# Patient Record
Sex: FEMALE | Race: WHITE | NOT HISPANIC OR LATINO | Employment: OTHER | URBAN - METROPOLITAN AREA
[De-identification: names, ages, dates, MRNs, and addresses within clinical notes are randomized per-mention and may not be internally consistent; named-entity substitution may affect disease eponyms.]

---

## 2017-03-01 ENCOUNTER — AMBULATORY - HEALTHEAST (OUTPATIENT)
Dept: CARDIOLOGY | Facility: CLINIC | Age: 82
End: 2017-03-01

## 2017-03-01 DIAGNOSIS — Z95.0 PACEMAKER: ICD-10-CM

## 2017-03-09 ENCOUNTER — AMBULATORY - HEALTHEAST (OUTPATIENT)
Dept: CARDIOLOGY | Facility: CLINIC | Age: 82
End: 2017-03-09

## 2017-03-09 DIAGNOSIS — I48.20 ATRIAL FIBRILLATION, CHRONIC (H): ICD-10-CM

## 2017-03-16 ENCOUNTER — AMBULATORY - HEALTHEAST (OUTPATIENT)
Dept: CARDIOLOGY | Facility: CLINIC | Age: 82
End: 2017-03-16

## 2017-03-16 DIAGNOSIS — I48.20 ATRIAL FIBRILLATION, CHRONIC (H): ICD-10-CM

## 2017-03-23 ENCOUNTER — AMBULATORY - HEALTHEAST (OUTPATIENT)
Dept: CARDIOLOGY | Facility: CLINIC | Age: 82
End: 2017-03-23

## 2017-03-23 DIAGNOSIS — I48.20 ATRIAL FIBRILLATION, CHRONIC (H): ICD-10-CM

## 2017-04-06 ENCOUNTER — AMBULATORY - HEALTHEAST (OUTPATIENT)
Dept: CARDIOLOGY | Facility: CLINIC | Age: 82
End: 2017-04-06

## 2017-04-06 DIAGNOSIS — I48.20 CHRONIC ATRIAL FIBRILLATION (H): ICD-10-CM

## 2017-04-06 DIAGNOSIS — I48.20 ATRIAL FIBRILLATION, CHRONIC (H): ICD-10-CM

## 2017-05-04 ENCOUNTER — AMBULATORY - HEALTHEAST (OUTPATIENT)
Dept: CARDIOLOGY | Facility: CLINIC | Age: 82
End: 2017-05-04

## 2017-05-04 DIAGNOSIS — I48.20 ATRIAL FIBRILLATION, CHRONIC (H): ICD-10-CM

## 2017-05-31 ENCOUNTER — RECORDS - HEALTHEAST (OUTPATIENT)
Dept: LAB | Facility: CLINIC | Age: 82
End: 2017-05-31

## 2017-05-31 LAB
CHOLEST SERPL-MCNC: 175 MG/DL
FASTING STATUS PATIENT QL REPORTED: NO
HDLC SERPL-MCNC: 52 MG/DL
LDLC SERPL CALC-MCNC: 96 MG/DL
TRIGL SERPL-MCNC: 136 MG/DL

## 2017-06-01 ENCOUNTER — AMBULATORY - HEALTHEAST (OUTPATIENT)
Dept: CARDIOLOGY | Facility: CLINIC | Age: 82
End: 2017-06-01

## 2017-06-01 DIAGNOSIS — I48.20 ATRIAL FIBRILLATION, CHRONIC (H): ICD-10-CM

## 2017-06-08 ENCOUNTER — AMBULATORY - HEALTHEAST (OUTPATIENT)
Dept: CARDIOLOGY | Facility: CLINIC | Age: 82
End: 2017-06-08

## 2017-06-08 DIAGNOSIS — I48.20 ATRIAL FIBRILLATION, CHRONIC (H): ICD-10-CM

## 2017-06-13 ENCOUNTER — AMBULATORY - HEALTHEAST (OUTPATIENT)
Dept: CARDIOLOGY | Facility: CLINIC | Age: 82
End: 2017-06-13

## 2017-06-13 DIAGNOSIS — I48.20 ATRIAL FIBRILLATION, CHRONIC (H): ICD-10-CM

## 2017-06-20 ENCOUNTER — AMBULATORY - HEALTHEAST (OUTPATIENT)
Dept: CARDIOLOGY | Facility: CLINIC | Age: 82
End: 2017-06-20

## 2017-06-20 DIAGNOSIS — I48.20 ATRIAL FIBRILLATION, CHRONIC (H): ICD-10-CM

## 2017-06-27 ENCOUNTER — AMBULATORY - HEALTHEAST (OUTPATIENT)
Dept: CARDIOLOGY | Facility: CLINIC | Age: 82
End: 2017-06-27

## 2017-06-27 DIAGNOSIS — I48.91 ATRIAL FIBRILLATION (H): ICD-10-CM

## 2017-06-27 DIAGNOSIS — I48.20 ATRIAL FIBRILLATION, CHRONIC (H): ICD-10-CM

## 2017-07-12 ENCOUNTER — AMBULATORY - HEALTHEAST (OUTPATIENT)
Dept: CARDIOLOGY | Facility: CLINIC | Age: 82
End: 2017-07-12

## 2017-07-12 DIAGNOSIS — I48.20 ATRIAL FIBRILLATION, CHRONIC (H): ICD-10-CM

## 2017-07-13 ENCOUNTER — AMBULATORY - HEALTHEAST (OUTPATIENT)
Dept: CARDIOLOGY | Facility: CLINIC | Age: 82
End: 2017-07-13

## 2017-07-13 DIAGNOSIS — Z95.0 CARDIAC PACEMAKER IN SITU: ICD-10-CM

## 2017-07-13 LAB — HCC DEVICE COMMENTS: NORMAL

## 2017-07-19 ENCOUNTER — AMBULATORY - HEALTHEAST (OUTPATIENT)
Dept: CARDIOLOGY | Facility: CLINIC | Age: 82
End: 2017-07-19

## 2017-07-19 DIAGNOSIS — I48.20 ATRIAL FIBRILLATION, CHRONIC (H): ICD-10-CM

## 2017-08-09 ENCOUNTER — AMBULATORY - HEALTHEAST (OUTPATIENT)
Dept: CARDIOLOGY | Facility: CLINIC | Age: 82
End: 2017-08-09

## 2017-08-09 DIAGNOSIS — I48.20 ATRIAL FIBRILLATION, CHRONIC (H): ICD-10-CM

## 2017-08-30 ENCOUNTER — AMBULATORY - HEALTHEAST (OUTPATIENT)
Dept: CARDIOLOGY | Facility: CLINIC | Age: 82
End: 2017-08-30

## 2017-08-30 DIAGNOSIS — I48.20 ATRIAL FIBRILLATION, CHRONIC (H): ICD-10-CM

## 2017-09-27 ENCOUNTER — RECORDS - HEALTHEAST (OUTPATIENT)
Dept: ADMINISTRATIVE | Facility: OTHER | Age: 82
End: 2017-09-27

## 2017-09-27 ENCOUNTER — AMBULATORY - HEALTHEAST (OUTPATIENT)
Dept: CARDIOLOGY | Facility: CLINIC | Age: 82
End: 2017-09-27

## 2017-09-27 DIAGNOSIS — I48.20 ATRIAL FIBRILLATION, CHRONIC (H): ICD-10-CM

## 2017-09-28 ENCOUNTER — HOSPITAL ENCOUNTER (OUTPATIENT)
Dept: CT IMAGING | Facility: HOSPITAL | Age: 82
Discharge: HOME | End: 2017-09-28
Attending: INTERNAL MEDICINE

## 2017-09-28 DIAGNOSIS — C50.919 BREAST CANCER (H): ICD-10-CM

## 2017-10-09 ENCOUNTER — RECORDS - HEALTHEAST (OUTPATIENT)
Dept: ADMINISTRATIVE | Facility: OTHER | Age: 82
End: 2017-10-09

## 2017-10-17 ENCOUNTER — AMBULATORY - HEALTHEAST (OUTPATIENT)
Dept: CARDIOLOGY | Facility: CLINIC | Age: 82
End: 2017-10-17

## 2017-10-17 DIAGNOSIS — Z95.0 CARDIAC PACEMAKER IN SITU: ICD-10-CM

## 2017-10-17 LAB — HCC DEVICE COMMENTS: NORMAL

## 2017-10-17 ASSESSMENT — MIFFLIN-ST. JEOR: SCORE: 1056.9

## 2017-10-23 ENCOUNTER — AMBULATORY - HEALTHEAST (OUTPATIENT)
Dept: PULMONOLOGY | Facility: OTHER | Age: 82
End: 2017-10-23

## 2017-10-23 DIAGNOSIS — K21.9 GERD (GASTROESOPHAGEAL REFLUX DISEASE): ICD-10-CM

## 2017-10-25 ENCOUNTER — AMBULATORY - HEALTHEAST (OUTPATIENT)
Dept: CARDIOLOGY | Facility: CLINIC | Age: 82
End: 2017-10-25

## 2017-10-25 DIAGNOSIS — I48.20 ATRIAL FIBRILLATION, CHRONIC (H): ICD-10-CM

## 2017-11-01 ENCOUNTER — AMBULATORY - HEALTHEAST (OUTPATIENT)
Dept: CARDIOLOGY | Facility: CLINIC | Age: 82
End: 2017-11-01

## 2017-11-01 DIAGNOSIS — I48.20 ATRIAL FIBRILLATION, CHRONIC (H): ICD-10-CM

## 2017-11-15 ENCOUNTER — OFFICE VISIT - HEALTHEAST (OUTPATIENT)
Dept: PULMONOLOGY | Facility: OTHER | Age: 82
End: 2017-11-15

## 2017-11-15 DIAGNOSIS — G47.34 NOCTURNAL HYPOXIA: ICD-10-CM

## 2017-11-15 DIAGNOSIS — K21.9 GASTROESOPHAGEAL REFLUX DISEASE WITHOUT ESOPHAGITIS: ICD-10-CM

## 2017-11-15 DIAGNOSIS — R09.02 HYPOXIA: ICD-10-CM

## 2017-11-15 DIAGNOSIS — J44.9 CHRONIC OBSTRUCTIVE PULMONARY DISEASE, UNSPECIFIED COPD TYPE (H): ICD-10-CM

## 2017-11-15 ASSESSMENT — MIFFLIN-ST. JEOR: SCORE: 1081.39

## 2017-11-28 ENCOUNTER — COMMUNICATION - HEALTHEAST (OUTPATIENT)
Dept: CARDIOLOGY | Facility: CLINIC | Age: 82
End: 2017-11-28

## 2017-11-28 DIAGNOSIS — I48.91 ATRIAL FIBRILLATION (H): ICD-10-CM

## 2017-11-29 ENCOUNTER — AMBULATORY - HEALTHEAST (OUTPATIENT)
Dept: CARDIOLOGY | Facility: CLINIC | Age: 82
End: 2017-11-29

## 2017-11-29 DIAGNOSIS — I48.20 ATRIAL FIBRILLATION, CHRONIC (H): ICD-10-CM

## 2017-12-06 ENCOUNTER — AMBULATORY - HEALTHEAST (OUTPATIENT)
Dept: CARDIOLOGY | Facility: CLINIC | Age: 82
End: 2017-12-06

## 2017-12-06 DIAGNOSIS — I48.20 ATRIAL FIBRILLATION, CHRONIC (H): ICD-10-CM

## 2017-12-19 ENCOUNTER — HOSPITAL ENCOUNTER (OUTPATIENT)
Dept: CT IMAGING | Facility: HOSPITAL | Age: 82
Discharge: HOME | End: 2017-12-19
Attending: INTERNAL MEDICINE

## 2017-12-19 DIAGNOSIS — D47.2 MONOCLONAL GAMMOPATHIES: ICD-10-CM

## 2017-12-20 ENCOUNTER — AMBULATORY - HEALTHEAST (OUTPATIENT)
Dept: CARDIOLOGY | Facility: CLINIC | Age: 82
End: 2017-12-20

## 2017-12-20 DIAGNOSIS — I48.20 ATRIAL FIBRILLATION, CHRONIC (H): ICD-10-CM

## 2017-12-29 ENCOUNTER — RECORDS - HEALTHEAST (OUTPATIENT)
Dept: ADMINISTRATIVE | Facility: OTHER | Age: 82
End: 2017-12-29

## 2017-12-29 ENCOUNTER — AMBULATORY - HEALTHEAST (OUTPATIENT)
Dept: CARDIOLOGY | Facility: CLINIC | Age: 82
End: 2017-12-29

## 2018-01-04 ENCOUNTER — AMBULATORY - HEALTHEAST (OUTPATIENT)
Dept: CARDIOLOGY | Facility: CLINIC | Age: 83
End: 2018-01-04

## 2018-01-04 DIAGNOSIS — I25.10 CORONARY ARTERY DISEASE: ICD-10-CM

## 2018-01-04 DIAGNOSIS — Z95.0 CARDIAC PACEMAKER IN SITU: ICD-10-CM

## 2018-01-04 DIAGNOSIS — I48.20 ATRIAL FIBRILLATION, CHRONIC (H): ICD-10-CM

## 2018-01-04 LAB
DIGOXIN LEVEL LHE- HISTORICAL: <0.3 NG/ML (ref 0.5–2)
HCC DEVICE COMMENTS: NORMAL
INTERNATIONAL NORMALIZATION RATIO, POC - HISTORICAL: 1.7

## 2018-01-04 ASSESSMENT — MIFFLIN-ST. JEOR: SCORE: 1043.29

## 2018-01-05 ENCOUNTER — COMMUNICATION - HEALTHEAST (OUTPATIENT)
Dept: CARDIOLOGY | Facility: CLINIC | Age: 83
End: 2018-01-05

## 2018-01-18 ENCOUNTER — AMBULATORY - HEALTHEAST (OUTPATIENT)
Dept: CARDIOLOGY | Facility: CLINIC | Age: 83
End: 2018-01-18

## 2018-01-18 DIAGNOSIS — I48.20 ATRIAL FIBRILLATION, CHRONIC (H): ICD-10-CM

## 2018-01-18 LAB — INTERNATIONAL NORMALIZATION RATIO, POC - HISTORICAL: 3.6 (ref 0.9–1.1)

## 2018-02-01 ENCOUNTER — AMBULATORY - HEALTHEAST (OUTPATIENT)
Dept: CARDIOLOGY | Facility: CLINIC | Age: 83
End: 2018-02-01

## 2018-02-01 DIAGNOSIS — I48.20 ATRIAL FIBRILLATION, CHRONIC (H): ICD-10-CM

## 2018-02-01 DIAGNOSIS — I48.91 A-FIB (H): ICD-10-CM

## 2018-02-01 LAB — INTERNATIONAL NORMALIZATION RATIO, POC - HISTORICAL: 2.1

## 2018-02-28 ENCOUNTER — AMBULATORY - HEALTHEAST (OUTPATIENT)
Dept: CARDIOLOGY | Facility: CLINIC | Age: 83
End: 2018-02-28

## 2018-02-28 DIAGNOSIS — I48.20 ATRIAL FIBRILLATION, CHRONIC (H): ICD-10-CM

## 2018-02-28 LAB — INTERNATIONAL NORMALIZATION RATIO, POC - HISTORICAL: 2.4

## 2018-03-28 ENCOUNTER — AMBULATORY - HEALTHEAST (OUTPATIENT)
Dept: CARDIOLOGY | Facility: CLINIC | Age: 83
End: 2018-03-28

## 2018-03-28 DIAGNOSIS — I48.20 ATRIAL FIBRILLATION, CHRONIC (H): ICD-10-CM

## 2018-03-28 LAB — INTERNATIONAL NORMALIZATION RATIO, POC - HISTORICAL: 1.5

## 2018-04-04 ENCOUNTER — AMBULATORY - HEALTHEAST (OUTPATIENT)
Dept: CARDIOLOGY | Facility: CLINIC | Age: 83
End: 2018-04-04

## 2018-04-04 DIAGNOSIS — Z95.0 CARDIAC PACEMAKER IN SITU: ICD-10-CM

## 2018-04-04 DIAGNOSIS — I48.20 ATRIAL FIBRILLATION, CHRONIC (H): ICD-10-CM

## 2018-04-04 LAB — INTERNATIONAL NORMALIZATION RATIO, POC - HISTORICAL: 2.1

## 2018-04-05 LAB — HCC DEVICE COMMENTS: NORMAL

## 2018-05-02 ENCOUNTER — AMBULATORY - HEALTHEAST (OUTPATIENT)
Dept: CARDIOLOGY | Facility: CLINIC | Age: 83
End: 2018-05-02

## 2018-05-02 DIAGNOSIS — I48.20 CHRONIC ATRIAL FIBRILLATION (H): ICD-10-CM

## 2018-05-02 DIAGNOSIS — I48.20 ATRIAL FIBRILLATION, CHRONIC (H): ICD-10-CM

## 2018-05-02 LAB — INTERNATIONAL NORMALIZATION RATIO, POC - HISTORICAL: 1.3

## 2018-05-09 ENCOUNTER — AMBULATORY - HEALTHEAST (OUTPATIENT)
Dept: CARDIOLOGY | Facility: CLINIC | Age: 83
End: 2018-05-09

## 2018-05-09 DIAGNOSIS — I48.20 ATRIAL FIBRILLATION, CHRONIC (H): ICD-10-CM

## 2018-05-09 LAB — INTERNATIONAL NORMALIZATION RATIO, POC - HISTORICAL: 1.7

## 2018-05-23 ENCOUNTER — AMBULATORY - HEALTHEAST (OUTPATIENT)
Dept: CARDIOLOGY | Facility: CLINIC | Age: 83
End: 2018-05-23

## 2018-05-23 DIAGNOSIS — I48.20 ATRIAL FIBRILLATION, CHRONIC (H): ICD-10-CM

## 2018-05-23 LAB — INTERNATIONAL NORMALIZATION RATIO, POC - HISTORICAL: 1.4

## 2018-05-30 ENCOUNTER — AMBULATORY - HEALTHEAST (OUTPATIENT)
Dept: CARDIOLOGY | Facility: CLINIC | Age: 83
End: 2018-05-30

## 2018-05-30 ENCOUNTER — COMMUNICATION - HEALTHEAST (OUTPATIENT)
Dept: CARDIOLOGY | Facility: CLINIC | Age: 83
End: 2018-05-30

## 2018-05-30 DIAGNOSIS — I48.91 A-FIB (H): ICD-10-CM

## 2018-05-30 DIAGNOSIS — I48.20 ATRIAL FIBRILLATION, CHRONIC (H): ICD-10-CM

## 2018-05-30 LAB — INTERNATIONAL NORMALIZATION RATIO, POC - HISTORICAL: 1.2

## 2018-06-06 ENCOUNTER — AMBULATORY - HEALTHEAST (OUTPATIENT)
Dept: CARDIOLOGY | Facility: CLINIC | Age: 83
End: 2018-06-06

## 2018-06-06 DIAGNOSIS — I48.20 ATRIAL FIBRILLATION, CHRONIC (H): ICD-10-CM

## 2018-06-06 LAB — INTERNATIONAL NORMALIZATION RATIO, POC - HISTORICAL: 1.6

## 2018-06-20 ENCOUNTER — AMBULATORY - HEALTHEAST (OUTPATIENT)
Dept: CARDIOLOGY | Facility: CLINIC | Age: 83
End: 2018-06-20

## 2018-06-20 DIAGNOSIS — I48.20 ATRIAL FIBRILLATION, CHRONIC (H): ICD-10-CM

## 2018-06-20 LAB — INTERNATIONAL NORMALIZATION RATIO, POC - HISTORICAL: 2.6

## 2018-07-05 ENCOUNTER — AMBULATORY - HEALTHEAST (OUTPATIENT)
Dept: CARDIOLOGY | Facility: CLINIC | Age: 83
End: 2018-07-05

## 2018-07-05 DIAGNOSIS — I48.20 ATRIAL FIBRILLATION, CHRONIC (H): ICD-10-CM

## 2018-07-05 LAB — INTERNATIONAL NORMALIZATION RATIO, POC - HISTORICAL: 2.7

## 2018-07-10 ENCOUNTER — AMBULATORY - HEALTHEAST (OUTPATIENT)
Dept: CARDIOLOGY | Facility: CLINIC | Age: 83
End: 2018-07-10

## 2018-07-10 DIAGNOSIS — Z95.0 PACEMAKER: ICD-10-CM

## 2018-07-11 LAB
HCC DEVICE COMMENTS: NORMAL
HCC DEVICE IMPLANTING PROVIDER: NORMAL
HCC DEVICE MANUFACTURE: NORMAL
HCC DEVICE MODEL: NORMAL
HCC DEVICE SERIAL NUMBER: NORMAL
HCC DEVICE TYPE: NORMAL

## 2018-07-15 ENCOUNTER — COMMUNICATION - HEALTHEAST (OUTPATIENT)
Dept: CARDIOLOGY | Facility: CLINIC | Age: 83
End: 2018-07-15

## 2018-07-15 DIAGNOSIS — I48.91 A-FIB (H): ICD-10-CM

## 2018-08-02 ENCOUNTER — AMBULATORY - HEALTHEAST (OUTPATIENT)
Dept: CARDIOLOGY | Facility: CLINIC | Age: 83
End: 2018-08-02

## 2018-08-02 DIAGNOSIS — I48.20 ATRIAL FIBRILLATION, CHRONIC (H): ICD-10-CM

## 2018-08-02 LAB — INTERNATIONAL NORMALIZATION RATIO, POC - HISTORICAL: 2.7

## 2018-08-29 ENCOUNTER — AMBULATORY - HEALTHEAST (OUTPATIENT)
Dept: CARDIOLOGY | Facility: CLINIC | Age: 83
End: 2018-08-29

## 2018-08-29 DIAGNOSIS — I48.20 ATRIAL FIBRILLATION, CHRONIC (H): ICD-10-CM

## 2018-08-29 LAB — INTERNATIONAL NORMALIZATION RATIO, POC - HISTORICAL: 1.8

## 2018-10-03 ENCOUNTER — AMBULATORY - HEALTHEAST (OUTPATIENT)
Dept: CARDIOLOGY | Facility: CLINIC | Age: 83
End: 2018-10-03

## 2018-10-03 DIAGNOSIS — I48.20 ATRIAL FIBRILLATION, CHRONIC (H): ICD-10-CM

## 2018-10-03 LAB — INTERNATIONAL NORMALIZATION RATIO, POC - HISTORICAL: 2.1

## 2018-10-12 ENCOUNTER — AMBULATORY - HEALTHEAST (OUTPATIENT)
Dept: CARDIOLOGY | Facility: CLINIC | Age: 83
End: 2018-10-12

## 2018-10-12 DIAGNOSIS — Z95.0 CARDIAC PACEMAKER IN SITU: ICD-10-CM

## 2018-10-12 ASSESSMENT — MIFFLIN-ST. JEOR: SCORE: 1038.75

## 2018-11-07 ENCOUNTER — AMBULATORY - HEALTHEAST (OUTPATIENT)
Dept: CARDIOLOGY | Facility: CLINIC | Age: 83
End: 2018-11-07

## 2018-11-07 DIAGNOSIS — I48.20 ATRIAL FIBRILLATION, CHRONIC (H): ICD-10-CM

## 2018-11-07 LAB — INTERNATIONAL NORMALIZATION RATIO, POC - HISTORICAL: 1.6

## 2018-11-21 ENCOUNTER — AMBULATORY - HEALTHEAST (OUTPATIENT)
Dept: CARDIOLOGY | Facility: CLINIC | Age: 83
End: 2018-11-21

## 2018-11-21 DIAGNOSIS — I48.20 ATRIAL FIBRILLATION, CHRONIC (H): ICD-10-CM

## 2018-11-21 LAB — INTERNATIONAL NORMALIZATION RATIO, POC - HISTORICAL: 1.6

## 2019-01-01 ENCOUNTER — COMMUNICATION - HEALTHEAST (OUTPATIENT)
Dept: CARDIOLOGY | Facility: CLINIC | Age: 84
End: 2019-01-01

## 2019-01-01 ENCOUNTER — RECORDS - HEALTHEAST (OUTPATIENT)
Dept: ADMINISTRATIVE | Facility: OTHER | Age: 84
End: 2019-01-01

## 2019-01-01 ENCOUNTER — AMBULATORY - HEALTHEAST (OUTPATIENT)
Dept: CARDIOLOGY | Facility: CLINIC | Age: 84
End: 2019-01-01

## 2019-01-01 ENCOUNTER — RECORDS - HEALTHEAST (OUTPATIENT)
Dept: LAB | Facility: CLINIC | Age: 84
End: 2019-01-01

## 2019-01-01 ENCOUNTER — SURGERY - HEALTHEAST (OUTPATIENT)
Dept: CARDIOLOGY | Facility: CLINIC | Age: 84
End: 2019-01-01

## 2019-01-01 ENCOUNTER — OFFICE VISIT - HEALTHEAST (OUTPATIENT)
Dept: PULMONOLOGY | Facility: OTHER | Age: 84
End: 2019-01-01

## 2019-01-01 DIAGNOSIS — Z95.0 CARDIAC PACEMAKER IN SITU: ICD-10-CM

## 2019-01-01 DIAGNOSIS — I48.20 ATRIAL FIBRILLATION, CHRONIC (H): ICD-10-CM

## 2019-01-01 DIAGNOSIS — I25.84 CORONARY ARTERY DISEASE DUE TO CALCIFIED CORONARY LESION: ICD-10-CM

## 2019-01-01 DIAGNOSIS — I25.10 CORONARY ARTERY DISEASE DUE TO CALCIFIED CORONARY LESION: ICD-10-CM

## 2019-01-01 DIAGNOSIS — J44.9 CHRONIC OBSTRUCTIVE PULMONARY DISEASE, UNSPECIFIED COPD TYPE (H): ICD-10-CM

## 2019-01-01 DIAGNOSIS — K21.9 GASTROESOPHAGEAL REFLUX DISEASE WITHOUT ESOPHAGITIS: ICD-10-CM

## 2019-01-01 DIAGNOSIS — I49.5 TACHY-BRADY SYNDROME (H): ICD-10-CM

## 2019-01-01 DIAGNOSIS — R09.02 HYPOXIA: ICD-10-CM

## 2019-01-01 LAB
ALBUMIN SERPL-MCNC: 3.3 G/DL (ref 3.5–5)
ALP SERPL-CCNC: 96 U/L (ref 45–120)
ALT SERPL W P-5'-P-CCNC: 15 U/L (ref 0–45)
ANION GAP SERPL CALCULATED.3IONS-SCNC: 10 MMOL/L (ref 5–18)
AST SERPL W P-5'-P-CCNC: 26 U/L (ref 0–40)
BILIRUB SERPL-MCNC: 0.7 MG/DL (ref 0–1)
BUN SERPL-MCNC: 14 MG/DL (ref 8–28)
CALCIUM SERPL-MCNC: 9.2 MG/DL (ref 8.5–10.5)
CHLORIDE BLD-SCNC: 106 MMOL/L (ref 98–107)
CHOLEST SERPL-MCNC: 126 MG/DL
CO2 SERPL-SCNC: 27 MMOL/L (ref 22–31)
CREAT SERPL-MCNC: 0.79 MG/DL (ref 0.6–1.1)
FASTING STATUS PATIENT QL REPORTED: ABNORMAL
GFR SERPL CREATININE-BSD FRML MDRD: >60 ML/MIN/1.73M2
GLUCOSE BLD-MCNC: 97 MG/DL (ref 70–125)
HCC DEVICE COMMENTS: NORMAL
HCC DEVICE IMPLANTING PROVIDER: NORMAL
HCC DEVICE MANUFACTURE: NORMAL
HCC DEVICE MODEL: NORMAL
HCC DEVICE SERIAL NUMBER: NORMAL
HCC DEVICE TYPE: NORMAL
HDLC SERPL-MCNC: 48 MG/DL
LDLC SERPL CALC-MCNC: 63 MG/DL
POC INR - HE - HISTORICAL: 3.8 (ref 0.9–1.1)
POTASSIUM BLD-SCNC: 4.7 MMOL/L (ref 3.5–5)
PROT SERPL-MCNC: 6.7 G/DL (ref 6–8)
SODIUM SERPL-SCNC: 143 MMOL/L (ref 136–145)
TRIGL SERPL-MCNC: 77 MG/DL
TSH SERPL DL<=0.005 MIU/L-ACNC: 1.01 UIU/ML (ref 0.3–5)

## 2019-01-01 ASSESSMENT — MIFFLIN-ST. JEOR
SCORE: 977.52
SCORE: 986.59
SCORE: 959.37
SCORE: 1007

## 2019-01-02 ENCOUNTER — AMBULATORY - HEALTHEAST (OUTPATIENT)
Dept: CARDIOLOGY | Facility: CLINIC | Age: 84
End: 2019-01-02

## 2019-01-02 DIAGNOSIS — I48.20 ATRIAL FIBRILLATION, CHRONIC (H): ICD-10-CM

## 2019-01-02 LAB — INTERNATIONAL NORMALIZATION RATIO, POC - HISTORICAL: 1.1

## 2019-01-11 ENCOUNTER — COMMUNICATION - HEALTHEAST (OUTPATIENT)
Dept: PULMONOLOGY | Facility: OTHER | Age: 84
End: 2019-01-11

## 2019-01-11 DIAGNOSIS — K21.9 GERD (GASTROESOPHAGEAL REFLUX DISEASE): ICD-10-CM

## 2019-01-16 ENCOUNTER — AMBULATORY - HEALTHEAST (OUTPATIENT)
Dept: CARDIOLOGY | Facility: CLINIC | Age: 84
End: 2019-01-16

## 2019-01-16 DIAGNOSIS — I48.20 ATRIAL FIBRILLATION, CHRONIC (H): ICD-10-CM

## 2019-01-16 LAB — INTERNATIONAL NORMALIZATION RATIO, POC - HISTORICAL: 1.4

## 2019-01-17 ENCOUNTER — COMMUNICATION - HEALTHEAST (OUTPATIENT)
Dept: CARDIOLOGY | Facility: CLINIC | Age: 84
End: 2019-01-17

## 2019-01-17 DIAGNOSIS — I48.91 A-FIB (H): ICD-10-CM

## 2019-01-18 ENCOUNTER — RECORDS - HEALTHEAST (OUTPATIENT)
Dept: ADMINISTRATIVE | Facility: OTHER | Age: 84
End: 2019-01-18

## 2019-01-24 ENCOUNTER — AMBULATORY - HEALTHEAST (OUTPATIENT)
Dept: CARDIOLOGY | Facility: CLINIC | Age: 84
End: 2019-01-24

## 2019-01-24 DIAGNOSIS — Z95.0 CARDIAC PACEMAKER IN SITU: ICD-10-CM

## 2019-01-24 DIAGNOSIS — Z79.01 LONG TERM (CURRENT) USE OF ANTICOAGULANTS: ICD-10-CM

## 2019-01-24 DIAGNOSIS — I25.84 CORONARY ARTERY DISEASE DUE TO CALCIFIED CORONARY LESION: ICD-10-CM

## 2019-01-24 DIAGNOSIS — I48.20 ATRIAL FIBRILLATION, CHRONIC (H): ICD-10-CM

## 2019-01-24 DIAGNOSIS — E78.5 HYPERLIPIDEMIA LDL GOAL <70: ICD-10-CM

## 2019-01-24 DIAGNOSIS — I25.10 CORONARY ARTERY DISEASE DUE TO CALCIFIED CORONARY LESION: ICD-10-CM

## 2019-01-24 LAB — POC INR - HE - HISTORICAL: 5 (ref 0.9–1.1)

## 2019-01-24 ASSESSMENT — MIFFLIN-ST. JEOR: SCORE: 995.09

## 2019-02-27 ENCOUNTER — AMBULATORY - HEALTHEAST (OUTPATIENT)
Dept: CARDIOLOGY | Facility: CLINIC | Age: 84
End: 2019-02-27

## 2019-02-27 DIAGNOSIS — I48.20 ATRIAL FIBRILLATION, CHRONIC (H): ICD-10-CM

## 2019-02-27 LAB — INTERNATIONAL NORMALIZATION RATIO, POC - HISTORICAL: 1.5

## 2019-03-05 ENCOUNTER — OFFICE VISIT - HEALTHEAST (OUTPATIENT)
Dept: PULMONOLOGY | Facility: OTHER | Age: 84
End: 2019-03-05

## 2019-03-05 DIAGNOSIS — J44.9 CHRONIC OBSTRUCTIVE PULMONARY DISEASE, UNSPECIFIED COPD TYPE (H): ICD-10-CM

## 2019-03-05 DIAGNOSIS — K21.9 GASTROESOPHAGEAL REFLUX DISEASE WITHOUT ESOPHAGITIS: ICD-10-CM

## 2019-03-05 DIAGNOSIS — R09.02 HYPOXIA: ICD-10-CM

## 2019-03-05 ASSESSMENT — MIFFLIN-ST. JEOR: SCORE: 976.94

## 2019-03-07 ENCOUNTER — AMBULATORY - HEALTHEAST (OUTPATIENT)
Dept: CARDIOLOGY | Facility: CLINIC | Age: 84
End: 2019-03-07

## 2019-03-07 DIAGNOSIS — I25.10 CORONARY ARTERY DISEASE DUE TO CALCIFIED CORONARY LESION: ICD-10-CM

## 2019-03-07 DIAGNOSIS — Z95.0 CARDIAC PACEMAKER IN SITU: ICD-10-CM

## 2019-03-07 DIAGNOSIS — I25.84 CORONARY ARTERY DISEASE DUE TO CALCIFIED CORONARY LESION: ICD-10-CM

## 2019-03-07 DIAGNOSIS — I48.20 ATRIAL FIBRILLATION, CHRONIC (H): ICD-10-CM

## 2019-03-07 LAB
ANION GAP SERPL CALCULATED.3IONS-SCNC: 6 MMOL/L (ref 5–18)
BUN SERPL-MCNC: 12 MG/DL (ref 8–28)
CALCIUM SERPL-MCNC: 9.7 MG/DL (ref 8.5–10.5)
CHLORIDE BLD-SCNC: 107 MMOL/L (ref 98–107)
CO2 SERPL-SCNC: 30 MMOL/L (ref 22–31)
CREAT SERPL-MCNC: 0.8 MG/DL (ref 0.6–1.1)
DIGOXIN LEVEL LHE- HISTORICAL: <0.3 NG/ML (ref 0.5–2)
GFR SERPL CREATININE-BSD FRML MDRD: >60 ML/MIN/1.73M2
GLUCOSE BLD-MCNC: 94 MG/DL (ref 70–125)
INTERNATIONAL NORMALIZATION RATIO, POC - HISTORICAL: 2.4
POTASSIUM BLD-SCNC: 5.1 MMOL/L (ref 3.5–5)
SODIUM SERPL-SCNC: 143 MMOL/L (ref 136–145)

## 2019-03-07 ASSESSMENT — MIFFLIN-ST. JEOR: SCORE: 976.94

## 2019-03-08 ENCOUNTER — COMMUNICATION - HEALTHEAST (OUTPATIENT)
Dept: CARDIOLOGY | Facility: CLINIC | Age: 84
End: 2019-03-08

## 2019-03-12 ENCOUNTER — COMMUNICATION - HEALTHEAST (OUTPATIENT)
Dept: CARDIOLOGY | Facility: CLINIC | Age: 84
End: 2019-03-12

## 2019-03-21 ENCOUNTER — AMBULATORY - HEALTHEAST (OUTPATIENT)
Dept: CARDIOLOGY | Facility: CLINIC | Age: 84
End: 2019-03-21

## 2019-03-21 DIAGNOSIS — Z95.0 CARDIAC PACEMAKER IN SITU: ICD-10-CM

## 2019-03-28 ENCOUNTER — COMMUNICATION - HEALTHEAST (OUTPATIENT)
Dept: CARDIOLOGY | Facility: CLINIC | Age: 84
End: 2019-03-28

## 2019-03-28 ENCOUNTER — AMBULATORY - HEALTHEAST (OUTPATIENT)
Dept: CARDIOLOGY | Facility: CLINIC | Age: 84
End: 2019-03-28

## 2019-03-28 DIAGNOSIS — I48.20 ATRIAL FIBRILLATION, CHRONIC (H): ICD-10-CM

## 2019-03-28 LAB — INTERNATIONAL NORMALIZATION RATIO, POC - HISTORICAL: 1.3

## 2019-04-04 ENCOUNTER — AMBULATORY - HEALTHEAST (OUTPATIENT)
Dept: CARDIOLOGY | Facility: CLINIC | Age: 84
End: 2019-04-04

## 2019-04-04 DIAGNOSIS — I48.20 ATRIAL FIBRILLATION, CHRONIC (H): ICD-10-CM

## 2019-04-04 LAB — INTERNATIONAL NORMALIZATION RATIO, POC - HISTORICAL: 5.5

## 2019-04-23 ENCOUNTER — COMMUNICATION - HEALTHEAST (OUTPATIENT)
Dept: CARDIOLOGY | Facility: CLINIC | Age: 84
End: 2019-04-23

## 2019-04-24 ENCOUNTER — COMMUNICATION - HEALTHEAST (OUTPATIENT)
Dept: CARDIOLOGY | Facility: CLINIC | Age: 84
End: 2019-04-24

## 2019-04-25 ENCOUNTER — AMBULATORY - HEALTHEAST (OUTPATIENT)
Dept: CARDIOLOGY | Facility: CLINIC | Age: 84
End: 2019-04-25

## 2019-04-25 DIAGNOSIS — I48.20 ATRIAL FIBRILLATION, CHRONIC (H): ICD-10-CM

## 2019-04-25 LAB — INTERNATIONAL NORMALIZATION RATIO, POC - HISTORICAL: 1.7

## 2019-05-15 ENCOUNTER — AMBULATORY - HEALTHEAST (OUTPATIENT)
Dept: CARDIOLOGY | Facility: CLINIC | Age: 84
End: 2019-05-15

## 2019-05-15 DIAGNOSIS — I48.20 ATRIAL FIBRILLATION, CHRONIC (H): ICD-10-CM

## 2019-05-15 LAB — INTERNATIONAL NORMALIZATION RATIO, POC - HISTORICAL: 1.8

## 2019-06-04 ENCOUNTER — COMMUNICATION - HEALTHEAST (OUTPATIENT)
Dept: ANTICOAGULATION | Facility: CLINIC | Age: 84
End: 2019-06-04

## 2020-01-01 ENCOUNTER — COMMUNICATION - HEALTHEAST (OUTPATIENT)
Dept: NEUROSURGERY | Facility: CLINIC | Age: 85
End: 2020-01-01

## 2020-01-01 ENCOUNTER — AMBULATORY - HEALTHEAST (OUTPATIENT)
Dept: PULMONOLOGY | Facility: OTHER | Age: 85
End: 2020-01-01

## 2020-01-01 ENCOUNTER — AMBULATORY - HEALTHEAST (OUTPATIENT)
Dept: CARDIOLOGY | Facility: CLINIC | Age: 85
End: 2020-01-01

## 2020-01-01 ENCOUNTER — DOCUMENTATION ONLY (OUTPATIENT)
Dept: OTHER | Facility: CLINIC | Age: 85
End: 2020-01-01

## 2020-01-01 ENCOUNTER — AMBULATORY - HEALTHEAST (OUTPATIENT)
Dept: INTENSIVE CARE | Facility: CLINIC | Age: 85
End: 2020-01-01

## 2020-01-01 ENCOUNTER — OFFICE VISIT - HEALTHEAST (OUTPATIENT)
Dept: PULMONOLOGY | Facility: OTHER | Age: 85
End: 2020-01-01

## 2020-01-01 ENCOUNTER — HOSPITAL ENCOUNTER (OUTPATIENT)
Dept: CT IMAGING | Facility: CLINIC | Age: 85
Discharge: HOME | End: 2020-06-05

## 2020-01-01 ENCOUNTER — HOME CARE/HOSPICE - HEALTHEAST (OUTPATIENT)
Dept: HOME HEALTH SERVICES | Facility: HOME HEALTH | Age: 85
End: 2020-01-01

## 2020-01-01 ENCOUNTER — RECORDS - HEALTHEAST (OUTPATIENT)
Dept: ADMINISTRATIVE | Facility: OTHER | Age: 85
End: 2020-01-01

## 2020-01-01 ENCOUNTER — OFFICE VISIT - HEALTHEAST (OUTPATIENT)
Dept: NEUROSURGERY | Facility: CLINIC | Age: 85
End: 2020-01-01

## 2020-01-01 ENCOUNTER — HOSPITAL ENCOUNTER (OUTPATIENT)
Dept: CT IMAGING | Facility: CLINIC | Age: 85
Discharge: HOME | End: 2020-05-19
Attending: NEUROLOGICAL SURGERY

## 2020-01-01 ENCOUNTER — AMBULATORY - HEALTHEAST (OUTPATIENT)
Dept: OTHER | Facility: CLINIC | Age: 85
End: 2020-01-01

## 2020-01-01 DIAGNOSIS — S06.320A INTRAPARENCHYMAL HEMATOMA OF BRAIN, LEFT, WITHOUT LOSS OF CONSCIOUSNESS, INITIAL ENCOUNTER (H): ICD-10-CM

## 2020-01-01 DIAGNOSIS — I61.9 ICH (INTRACEREBRAL HEMORRHAGE) (H): ICD-10-CM

## 2020-01-01 DIAGNOSIS — J93.12 SECONDARY SPONTANEOUS PNEUMOTHORAX: ICD-10-CM

## 2020-01-01 DIAGNOSIS — I49.5 TACHY-BRADY SYNDROME (H): ICD-10-CM

## 2020-01-01 DIAGNOSIS — Z95.0 CARDIAC PACEMAKER IN SITU: ICD-10-CM

## 2020-01-01 DIAGNOSIS — K21.9 GERD (GASTROESOPHAGEAL REFLUX DISEASE): ICD-10-CM

## 2021-05-24 ENCOUNTER — RECORDS - HEALTHEAST (OUTPATIENT)
Dept: ADMINISTRATIVE | Facility: CLINIC | Age: 86
End: 2021-05-24

## 2021-05-25 ENCOUNTER — RECORDS - HEALTHEAST (OUTPATIENT)
Dept: ADMINISTRATIVE | Facility: CLINIC | Age: 86
End: 2021-05-25

## 2021-05-26 ENCOUNTER — RECORDS - HEALTHEAST (OUTPATIENT)
Dept: ADMINISTRATIVE | Facility: CLINIC | Age: 86
End: 2021-05-26

## 2021-05-27 NOTE — TELEPHONE ENCOUNTER
Pt was called with report,spent 30 minutes on phone with pt to review meds so she is taking them correctly  Had pt go thru each med checking name and dose on each bottle  Pt is not very cooperative on the phone with this telling me she can not see well, has her  Med bottle are all over   I suggested to pt to keep all pill bottles together in a box, if meds are running low call in for refill do not wait until they are all gone  This ws repeated to pt numerous time  In past I spoke to son who states he and his wife have tried helping with this and they get in a fight because pt does not like to listen and has her own way of doing .  I will try to reach son again to update pt will need a follow up in 4 to 6 weeks and ask son if someone can come with her.

## 2021-05-27 NOTE — TELEPHONE ENCOUNTER
Pt's son returned my request call to review remote report and med check for his mom  Dennis states she is extremely difficult to work with at home, his wife try's to help with meds and her paper work and pt becomes very belligerent and mean to them  Reviewed Joceline's note and meds he understands and will check when he gets home  Dennis states she is mobile but refuses to walk around states she has everything including her meds right in front of her on a coffle table and just watches TV and get up to go to bathroom or get something to eat.  He has my direct number if needs and Joceline updated.

## 2021-05-27 NOTE — PROGRESS NOTES
INR 1.3 pt has missed doses. Will take 4 mg daily and retest in one week. After talking with pt and discussing history of greens/salads and medication change. Pt will  continue  with current diet and dosing of Warfarin.  Continue with moderation of Vit K and green leafy vegetables. Cautioned to call with increase bruising or bleeding. Reminded to call with medication change especially antibiotic. Call with any questions or concerns or any up coming procedures. Cautioned about using Herbal medication.

## 2021-05-27 NOTE — PATIENT INSTRUCTIONS - HE
INR 5.5  After talking with pt and discussing history of greens/salads and medication change. Pt will  continue  with current diet and dosing of Warfarin.  Continue with moderation of Vit K and green leafy vegetables. Cautioned to call with increase bruising or bleeding. Reminded to call with medication change especially antibiotic. Call with any questions or concerns or any up coming procedures. Cautioned about using Herbal medication.

## 2021-05-27 NOTE — TELEPHONE ENCOUNTER
----- Message from Joceline Miner CNP sent at 3/26/2019  2:41 PM CDT -----  Regarding: FW: Device remote results  Please let Tiffanie know that her heart rates look better.  Continue metoprolol succinate 100 mg daily, digoxin 125 mcg daily, and diltiazem 240 mg daily.  Follow up in 4-6 weeks.  Thanks,  Joceline  ----- Message -----  From: Kandice Moore  Sent: 3/21/2019   9:09 AM  To: Joceline Miner CNP  Subject: Device remote results                            Type: routine two week pacemaker remote transmission done per Joceline Miner NP.  Presenting rhythm: atrial fibrillation with ventricular sensing  bpm. avg. 90's.  Battery/lead status: stable.  Arrhythmias: since 3/7/19; no ventricular high rate episodes detected, overall v-rates >/=120bpm ~20%.  Anticoagulant: Coumadin.  Comments: Normal magnet and pacemaker function. Routed results to Joceline. EVAN

## 2021-05-27 NOTE — PROGRESS NOTES
INR 5.5 hold Warfarin for Thur and Friday and then 2 mg Sat, tue and thur and 4 mg all other days. Retest in one week. After talking with pt and discussing history of greens/salads and medication change. Pt will  continue  with current diet and dosing of Warfarin.  Continue with moderation of Vit K and green leafy vegetables. Cautioned to call with increase bruising or bleeding. Reminded to call with medication change especially antibiotic. Call with any questions or concerns or any up coming procedures. Cautioned about using Herbal medication.

## 2021-05-28 ENCOUNTER — RECORDS - HEALTHEAST (OUTPATIENT)
Dept: ADMINISTRATIVE | Facility: CLINIC | Age: 86
End: 2021-05-28

## 2021-05-28 NOTE — TELEPHONE ENCOUNTER
Left VM for son Dennis that I had spoken with pt and she states she is taking warfarin. Will see pt thur and get INR. Concerned for stress on family and needs for help from PCP.

## 2021-05-28 NOTE — PATIENT INSTRUCTIONS - HE
INR 1.7 increase dose to Tue, sat 2 mg and 4 mg all other days. After talking with pt and discussing history of greens/salads and medication change. Pt will  continue  with current diet and dosing of Warfarin.  Continue with moderation of Vit K and green leafy vegetables. Cautioned to call with increase bruising or bleeding. Reminded to call with medication change especially antibiotic. Call with any questions or concerns or any up coming procedures. Cautioned about using Herbal medication.

## 2021-05-28 NOTE — TELEPHONE ENCOUNTER
Spoke with pt and she states she is taking her Warfarin. remindered her to take 4 mg daily and I will see her thurs. She agreed with plan.

## 2021-05-28 NOTE — TELEPHONE ENCOUNTER
"----- Message from Myra Raman sent at 4/23/2019  4:41 PM CDT -----  Contact: PTS SON STOPPED IN  General phone call:    Caller: PTS SON ADRIANA CAME IN  Primary cardiologist: rocky  Detailed reason for call: adriana is very worried about his mom. She stopped taking her warfarin because she thinks that he is \"crazy\" and she doesn't need it. He is hoping liza can call and speak to her. He said even if someone can call and leave her a message that she needs to take it, because she does not believe him. He is going to try to bring her in this Thursday for an INR but she has been very combative recently, per adriana  New or active symptoms? n/a  Best phone number: [phone]  Best time to contact: any  Ok to leave a detailed message? yes  Device? yes    Additional Info:   "

## 2021-05-28 NOTE — PROGRESS NOTES
INR 1.8 increase dose to 2 mg on tue and 4 mg all other days. Pt will be going to PCP for further INRs.

## 2021-05-28 NOTE — PROGRESS NOTES
INR 1.7 increase dose to 2 mg tue and sat and 4 mg all other days. Retest in 2 weeks. Discussed INR changes as of May 20. Pt states she will go to PCP. After talking with pt and discussing history of greens/salads and medication change. Pt will  continue  with current diet and dosing of Warfarin.  Continue with moderation of Vit K and green leafy vegetables. Cautioned to call with increase bruising or bleeding. Reminded to call with medication change especially antibiotic. Call with any questions or concerns or any up coming procedures. Cautioned about using Herbal medication.

## 2021-05-28 NOTE — PATIENT INSTRUCTIONS - HE
INR 1.8 increase dose too 2 mg Tue and 4 mg all other days. Retest in 2 weeks. After talking with pt and discussing history of greens/salads and medication change. Pt will  continue  with current diet and dosing of Warfarin.  Continue with moderation of Vit K and green leafy vegetables. Cautioned to call with increase bruising or bleeding. Reminded to call with medication change especially antibiotic. Call with any questions or concerns or any up coming procedures. Cautioned about using Herbal medication.

## 2021-05-28 NOTE — TELEPHONE ENCOUNTER
"----- Message from Myra Raman sent at 4/23/2019  4:41 PM CDT -----  Contact: PTS SON STOPPED IN  General phone call:    Caller: PTS SON ADRIANA CAME IN  Primary cardiologist: rocky  Detailed reason for call: adriana is very worried about his mom. She stopped taking her warfarin because she thinks that he is \"crazy\" and she doesn't need it. He is hoping liza can call and speak to her. He said even if someone can call and leave her a message that she needs to take it, because she does not believe him. He is going to try to bring her in this Thursday for an INR but she has been very combative recently, per adriana  New or active symptoms? n/a  Best phone number: [phone]  Best time to contact: any  Ok to leave a detailed message? yes  Device? yes    Additional Info:   "

## 2021-05-29 NOTE — TELEPHONE ENCOUNTER
ACN called and left a message for the patient to call back and confirm that she will have her anticoagulation managed by PCP with [facility].    Awaiting for call back.    Dana Kumar RN

## 2021-05-30 NOTE — PATIENT INSTRUCTIONS - HE
"Tiffanie Sharma,    It was a pleasure to see you today at the [facility].     My recommendations after this visit include:    For now, continue medications as listed (as well as \"water pill\" started by Dr. Felix)    Recommend AV node ablation to control heart rate (plan to stop digoxin and diltiazem after the procedure)    Follow up in 6 weeks.    Joceline Miner, [facility], Electrophysiology  [phone]  EP nurses [phone]            "

## 2021-05-30 NOTE — PROGRESS NOTES
In clinic device check with Device RN followed by office visit with Joceline Miner NP.  Please see link for full device report.  Patient was informed of results and next follow up during today's visit.

## 2021-05-30 NOTE — TELEPHONE ENCOUNTER
ACN called and left a message for the patient to call back and confirm that PCP with [facility] is the one managing INR/Warfarin at this time.    Awaiting call back.    Dana Kumar RN

## 2021-05-31 VITALS — BODY MASS INDEX: 29.45 KG/M2 | HEIGHT: 60 IN | WEIGHT: 150 LBS

## 2021-05-31 VITALS — WEIGHT: 155.4 LBS | BODY MASS INDEX: 30.51 KG/M2 | HEIGHT: 60 IN

## 2021-05-31 VITALS — WEIGHT: 147 LBS | HEIGHT: 60 IN | BODY MASS INDEX: 28.86 KG/M2

## 2021-05-31 NOTE — PROGRESS NOTES
Pre-Intra-Post education and instructions as listed below were reviewed with patient via phone, also discussed with patients son, Dennis, per patients request.   Requested that patient get an INR at her PMD prior to the procedure.  H&P completed by Joceline Miner on 7-25-19.  Kiera Peralta RN 8/13/2019 3:07 PM

## 2021-05-31 NOTE — PROGRESS NOTES
1935  Home:[phone] (home) Cell:[phone] (mobile)  Emergency Contact: Dennis Saunders [phone]    +++Important patient information for CSC/Cath Lab staff : None+++    [facility] EP Cath Lab Procedure Order   Ablation Type:  AVN Ablation- pt has a device  Specific location of pathway: Right     Diagnosis:  AF  Anticipated Case Duration:  Standard   Scheduling Needs/Timeframe:  Next Available Please call jeannie Collins to schedule    MD Preference: Dr Curry ROWLEY Assist:  No Specific MD:  N/A    Current Device: Dual Pacemaker  Device Company/Device Rep Needed for Procedure: [employer]    Pre-Procedural Testing needed: None  Mapping System Required:  None  ICE Needed:  No  Special equipment/Staff needed for case: None  Anesthesia:  Conscious Sedation  Research Protocol:  No    [facility] EP Cath Lab Prep   Ordering Provider: Dr Zapien  Ordering Date: 8/7/2019  Orders Status: Intial order placed and Order set placed    H&P:  Compled by Joceline Miner  on 7-25-19 if scheduled within 30 days, pt to schedule with PMD if procedure outside of this timeframe  PCP: Zhen Felix MD, [phone]    Pre-op Labs: Ordered AM of procedure    Medical Records Pertinent for Procedure:  Holter 6-25-14, Echo 5-13-14 EF 79%, EKG 5-31-17 Afib @84 and Device Implant Record Implant 11-25-13 w/DND    Patient Education:    Teach with Patient: Will be completed via phone prior to procedure, and letter was also sent to pt via mail/[employer] with written pre-procedural instructions.    Risks Reviewed:        Cardiac Catheter Ablation    <1% risk for the following: hypotension, hemorrhage, vascular injury including perforation of vein, artery or heart, thrombophlebitis, systemic or pulmonic emboli; cardiac perforation, (tamponade), infection, pneumothorax, arrhythmias, proarrhythmic effects of drugs, radiation exposure.    1-2% complete heart block (for AVNRT or septol accessory pathway).    <0.5% CVA or MI    <0.1% death    If external defibrillation is  needed, 75% risk for superficial burn.    1-2% tamponade and aortic puncture with left sided transeptal approach for left side CANDACE - increase risk of CVA to <2%.    Late arrhythmia recurrences depends upon the primary rhythm disturbance.      Consent: Will be obtained in [facility] day of procedure    Pre-Procedure Instructions that were Reviewed with Patient:  NPO after midnight, Remove all jewelry prior to coming in for procedure, Shower prior to arrival, Notified patient of time and date of procedure by CV , Transportation arrangements needed s/p procedure, Post-procedure follow up process, Sedation plan/orders and Pre-procedure letter was sent to pt by CV     Pre-Procedure Medication Instructions:  Instructions given to pt regarding anticoagulants: Coumadin- instructed to continue anticoagulation uninterrupted through their procedure  Instructions given to pt regarding antiarrhythmic medication: N/A for this type of procedure; Pt instructed to continue medication prior to procedure  Instructions for medication, other than anticoagulants/antiarrhythmics listed above, given to pt: to take all morning medications with small sips of water, with the exception of OTC supplements and MVI    Allergies   Allergen Reactions     Acetaminophen-Codeine      Advair Diskus [Fluticasone Propion-Salmeterol] Other (See Comments)     Pt c/o muscle and bone paing.  Cramping, hoarseness, and dry mouth.     Flovent [Fluticasone] Other (See Comments)     Pt c/o muscle and bone pain.  Cramping, hoarseness, and dry mouth.     Ibuprofen      Nsaids (Non-Steroidal Anti-Inflammatory Drug)      Symbicort [Budesonide-Formoterol] Other (See Comments)     Pt c/o muscle and bone pain.  Cramping, hoarseness, and dry mouth       Current Outpatient Medications:      alendronate (FOSAMAX) 70 MG tablet, Take 70 mg by mouth every 7 days. , Disp: , Rfl:      aspirin 81 MG EC tablet, Take 81 mg by mouth daily., Disp: , Rfl:      atorvastatin  (LIPITOR) 40 MG tablet, Take 1 tablet (40 mg total) by mouth at bedtime., Disp: 30 tablet, Rfl: 6     calcium-vitamin D (CALCIUM-VITAMIN D) 500 mg(1,250mg) -200 unit per tablet, Take 1 tablet by mouth 2 (two) times a day with meals., Disp: , Rfl:      cholecalciferol, vitamin D3, 1,000 unit tablet, Take 1,000 Units by mouth daily., Disp: , Rfl:      citalopram (CELEXA) 10 MG tablet, Take 10 mg by mouth daily. , Disp: , Rfl:      clobetasol (TEMOVATE) 0.05 % cream, Apply 1 application topically 2 (two) times a day as needed. , Disp: , Rfl:      digoxin (LANOXIN) 125 mcg tablet, TAKE ONE TABLET BY MOUTH DAILY, Disp: 90 tablet, Rfl: 0     diltiazem (CARDIZEM CD) 120 MG 24 hr capsule, Take 2 capsules (240 mg total) by mouth daily., Disp: 60 capsule, Rfl: 11     gabapentin (NEURONTIN) 800 MG tablet, Take 800 mg by mouth 2 (two) times a day. , Disp: , Rfl: 1     GLUC INIGUEZ/CHONDRO INIGUEZ A/VIT C/MN (GLUCOSAMINE 1500 COMPLEX ORAL), Take 1,500 mg by mouth daily. , Disp: , Rfl:      metoprolol succinate (TOPROL-XL) 100 MG 24 hr tablet, Take 1 tablet (100 mg total) by mouth daily., Disp: 90 tablet, Rfl: 3     multivitamin with minerals tablet, Take 1 tablet by mouth daily., Disp: , Rfl:      PROAIR HFA 90 mcg/actuation inhaler, Inhale 2 puffs every 6 (six) hours as needed. , Disp: , Rfl:      ranitidine (ZANTAC) 150 MG tablet, Take 1 tablet (150 mg total) by mouth at bedtime., Disp: 30 tablet, Rfl: 12     traZODone (DESYREL) 50 MG tablet, Take 50 mg by mouth bedtime. , Disp: , Rfl:      triamcinolone (KENALOG) 0.5 % cream, Apply topically 3 (three) times a day., Disp: , Rfl:      warfarin (COUMADIN/JANTOVEN) 2 MG tablet, Take 2 mg tue, thur and Fri and 4 mg all other days., Disp: 60 tablet, Rfl: 0    Documentation Date:8/7/2019 11:45 AM  Kiera Peralta RN

## 2021-06-01 ENCOUNTER — RECORDS - HEALTHEAST (OUTPATIENT)
Dept: ADMINISTRATIVE | Facility: CLINIC | Age: 86
End: 2021-06-01

## 2021-06-01 NOTE — PATIENT INSTRUCTIONS - HE
Tiffanie Sharma,    It was a pleasure to see you today at the [facility].     My recommendations after this visit include:    Decrease metoprolol to 50 mg once a day, take at bedtime.  New prescription sent for 50 mg tabs.    INR today = 3.8  Decrease warfarin: 6 mg (3 tabs) on Fridays with 4 mg (2 tabs) the other 6 of 7 days.  Recheck INR at primary clinic in 1 week.    Follow up in 3 months, or sooner if needed.    Joceline Miner, [facility], Electrophysiology  [phone]  EP nurses [phone]

## 2021-06-01 NOTE — PROGRESS NOTES
In clinic device check with Joceline Miner RN CNP.  Please see link for full device report.  Patient was informed of results and next follow up during today's visit.

## 2021-06-02 ENCOUNTER — RECORDS - HEALTHEAST (OUTPATIENT)
Dept: ADMINISTRATIVE | Facility: CLINIC | Age: 86
End: 2021-06-02

## 2021-06-02 VITALS — BODY MASS INDEX: 24.17 KG/M2 | HEIGHT: 61 IN | WEIGHT: 128 LBS

## 2021-06-02 VITALS — HEIGHT: 61 IN | BODY MASS INDEX: 24.92 KG/M2 | WEIGHT: 132 LBS

## 2021-06-02 VITALS — HEIGHT: 62 IN | BODY MASS INDEX: 25.58 KG/M2 | WEIGHT: 139 LBS

## 2021-06-02 VITALS — WEIGHT: 128 LBS | BODY MASS INDEX: 24.17 KG/M2 | HEIGHT: 61 IN

## 2021-06-03 VITALS — BODY MASS INDEX: 23.61 KG/M2 | WEIGHT: 126 LBS

## 2021-06-03 VITALS — BODY MASS INDEX: 22.77 KG/M2 | HEIGHT: 63 IN | WEIGHT: 128.5 LBS

## 2021-06-03 VITALS
HEIGHT: 63 IN | DIASTOLIC BLOOD PRESSURE: 80 MMHG | RESPIRATION RATE: 16 BRPM | SYSTOLIC BLOOD PRESSURE: 118 MMHG | HEART RATE: 71 BPM | WEIGHT: 124 LBS | OXYGEN SATURATION: 86 % | BODY MASS INDEX: 21.97 KG/M2

## 2021-06-03 VITALS
SYSTOLIC BLOOD PRESSURE: 114 MMHG | RESPIRATION RATE: 20 BRPM | WEIGHT: 126 LBS | HEART RATE: 76 BPM | BODY MASS INDEX: 22.32 KG/M2 | DIASTOLIC BLOOD PRESSURE: 64 MMHG

## 2021-06-03 NOTE — PROGRESS NOTES
Oxygen saturation walk test     Oxygen continuous dose testing  While ambulating 150ft on 2LPM continuous dose, oxygen saturation is 94%.      DME Provider: Angeline    Patient is ambulatory within his/her home.

## 2021-06-03 NOTE — PATIENT INSTRUCTIONS - HE
1. O2 supplementation 2 LPM with activities and at night  2. Albuterol HFA as needed  3. Raise the head of the bed  4. Avoid eating close to bed time  5. Famotidine 20 mg at night as needed  6. Increase physical activity  7. Follow up in 6 months.

## 2021-06-03 NOTE — PROGRESS NOTES
PULMONARY OUTPATIENT FOLLOW UP NOTE     Assessment:   1. Severe COPD on home O2  FEV1 31% (2013)  Patient did not tolerate LABA/ICS/LAMA due to different reported size effects.  Doing well on albuterol HFA 4 times a day.    Continue O2 supplementation 2 LPM with activities and at night.   Increase physical activity.   2. GERD  Raise the head of the bed, avoid eating close to bed time. Continue PPI daily   3. HTN, CAD, SSS s/p pacemaker  4. Hx of atrial fibrillation, anticoagulated  5. Hx of breast cancer s/p R lumpectomy and radiation therapy      Plan:   1. O2 supplementation 2 LPM with activities and at night  2. Albuterol HFA as needed  3. Raise the head of the bed  4. Avoid eating close to bed time  5. Famotidine 20 mg at night as needed  6. Increase physical activity  7. Follow up in 6 months.      Moshe Ravi  Pulmonary / Critical Care  11/22/2019        CC:            Chief Complaint   Patient presents with     Follow Up      HPI:        Tiffanie Sharma is an 82 y.o. female with history of COPD on home O2, breast cancer dx 2009 s/p right lumpectomy and radiation therapy, HTN, CAD, a fib/ a flutter anticoagulated, SSS s/p pacemaker.   Pt is here for follow up appointment.   Since last visit, patient was admitted to the hospital and underwent RF ablation of his bundle due to chronic atrial fibrillation with RVR. Post procedure third degree AV block, no escape rhythm, she is pacemaker dependent.   Doing well since last visit.   Able to walk close to a block before feeling short of breath, uses her O2 supplementation at night and with activities.  Denies orthopnea, no PND, no swelling of LE, no chest pain.   No using LABA/LAMA or ICS due to hoarseness, dry mouth.   Uses albuterol two puffs 2 to 3 times a day.   Denies acid reflux symptoms while taking pantoprazole 40 mg daily.   Weight loss quantified in 20 lbs since 2017.   Pt has history of tobacco use, 2 ppd for 50 + years, quit 8  years     PMH  1. Esophageal Reflux 530.81   2. Hyperlipidemia 272.4   3. Coronary Artery Disease 414.00   4. HTN  5. CHF diastolic dysfunction  6. COPD on O2 supplementation 2 LPM at night and with activities   7. Atrial Flutter / Atrial Fibrillation        Current Outpatient Medications:      alendronate (FOSAMAX) 70 MG tablet, Take 70 mg by mouth every 7 days. , Disp: , Rfl:      aspirin 81 MG EC tablet, Take 81 mg by mouth daily., Disp: , Rfl:      atorvastatin (LIPITOR) 40 MG tablet, Take 1 tablet (40 mg total) by mouth at bedtime., Disp: 30 tablet, Rfl: 6     calcium-vitamin D (CALCIUM-VITAMIN D) 500 mg(1,250mg) -200 unit per tablet, Take 1 tablet by mouth 2 (two) times a day with meals., Disp: , Rfl:      cholecalciferol, vitamin D3, 1,000 unit tablet, Take 1,000 Units by mouth daily., Disp: , Rfl:      citalopram (CELEXA) 10 MG tablet, Take 10 mg by mouth daily. , Disp: , Rfl:      clobetasol (TEMOVATE) 0.05 % cream, Apply 1 application topically 2 (two) times a day as needed. , Disp: , Rfl:      digoxin (LANOXIN) 125 mcg tablet, TAKE ONE TABLET BY MOUTH DAILY, Disp: 90 tablet, Rfl: 0     gabapentin (NEURONTIN) 800 MG tablet, Take 800 mg by mouth 2 (two) times a day. , Disp: , Rfl: 1     GLUC INIGUEZ/CHONDRO INIGUEZ A/VIT C/MN (GLUCOSAMINE 1500 COMPLEX ORAL), Take 1,500 mg by mouth daily. , Disp: , Rfl:      metoprolol succinate (TOPROL-XL) 100 MG 24 hr tablet, Take 1 tablet (100 mg total) by mouth daily., Disp: 90 tablet, Rfl: 3     multivitamin with minerals tablet, Take 1 tablet by mouth daily., Disp: , Rfl:      PROAIR HFA 90 mcg/actuation inhaler, Inhale 2 puffs every 6 (six) hours as needed. , Disp: , Rfl:      traZODone (DESYREL) 50 MG tablet, Take 50 mg by mouth bedtime. , Disp: , Rfl:      triamcinolone (KENALOG) 0.5 % cream, Apply topically 3 (three) times a day., Disp: , Rfl:      warfarin (COUMADIN/JANTOVEN) 2 MG tablet, Take 2 mg tue, thur and Fri and 4 mg all other days., Disp: 60 tablet, Rfl: 0      diltiazem (CARDIZEM CD) 120 MG 24 hr capsule, Take 2 capsules (240 mg total) by mouth daily., Disp: 60 capsule, Rfl: 11     ranitidine (ZANTAC) 150 MG tablet, Take 1 tablet (150 mg total) by mouth at bedtime., Disp: 30 tablet, Rfl: 12     Social History     Socioeconomic History     Marital status:      Spouse name: Not on file     Number of children: Not on file     Years of education: Not on file     Highest education level: Not on file   Occupational History     Not on file   Social Needs     Financial resource strain: Not on file     Food insecurity:     Worry: Not on file     Inability: Not on file     Transportation needs:     Medical: Not on file     Non-medical: Not on file   Tobacco Use     Smoking status: Former Smoker     Last attempt to quit: 2010     Years since quittin.3     Smokeless tobacco: Never Used   Substance and Sexual Activity     Alcohol use: No     Drug use: No     Sexual activity: Not on file   Lifestyle     Physical activity:     Days per week: Not on file     Minutes per session: Not on file     Stress: Not on file   Relationships     Social connections:     Talks on phone: Not on file     Gets together: Not on file     Attends Alevism service: Not on file     Active member of club or organization: Not on file     Attends meetings of clubs or organizations: Not on file     Relationship status: Not on file     Intimate partner violence:     Fear of current or ex partner: Not on file     Emotionally abused: Not on file     Physically abused: Not on file     Forced sexual activity: Not on file   Other Topics Concern     Not on file   Social History Narrative     Not on file     Family History   Problem Relation Age of Onset     No Medical Problems Mother       No Medical Problems Father       No Medical Problems Sister       No Medical Problems Daughter       No Medical Problems Maternal Grandmother       No Medical Problems Maternal Grandfather       No Medical Problems  "Paternal Grandmother       No Medical Problems Paternal Grandfather       No Medical Problems Maternal Aunt       No Medical Problems Paternal Aunt       BRCA 1/2 Neg Hx       Breast cancer Neg Hx       Cancer Neg Hx       Colon cancer Neg Hx       Endometrial cancer Neg Hx       Ovarian cancer Neg Hx        Review of Systems  A 12 point comprehensive review of systems was negative except as noted.      Objective:      Vitals:    03/05/19 1106   BP: 128/60   Pulse: (!) 129   SpO2: 87 % on RA , 98% on 2 LPM    Weight: 128 lb (58.1 kg)   Height: 5' 1.25\" (1.556 m)    SpO2 while ambulating 300ft on 2LPM oxygen saturation is 94%.    Gen: awake, alert, no distress  HEENT: pale conjunctiva, moist mucosa  Neck; no thyromegaly, masses or JVD  Lungs; decrease BS both hemithorax, prolonged expiration  CV: irregular, no murmurs or gallops appreciated  Abd: soft, distended, BS wnl  Ext: no edema      Diagnostic tests:      PFTs (12/11/2013)   FVC 1.58 L (59%) FEV1 0.62 l (31 %) FEV1/FVC 39% Significant postbronchodilator response FVC 1.85 L (increase in 16%)   TLC 7.31 L (149%) RV 5.40 l (239%)   DLCO 44% and DLCO/VA 66%      Echocardiogram (5/13/2014)   Normal LV size and function, EF 79%, no regional wall motion abnormality, diastolic dysfunction grade 3, mild LVHT, mild MS, RV systolic function is mildly reduced.     Chest CT scan (11/16/13)   LUNGS AND PLEURA: Lungs are hyperinflated WITH emphysematous change. Left-sided pneumothorax is completely resolved. Small bilateral pleural effusions with mild bibasilar atelectasis posteriorly. Benign stable partially calcified chronic consolidation and cicatricial volume loss in the posterior segment right upper lobe, apical posterior segment left upper lobe and superior segments of both lower lobes. MEDIASTINUM: Dense coronary artery calcification. Mild enlargement central pulmonary arteries is associated with some degree of pulmonary arterial hypertension. Benign circumscribed " hypodense focus left thyroid lobe has decreased in size from 2010 therefore benign. Small lymph nodes in the anterior mediastinal fat adjacent to the inferior heart have developed (image 74 of series 4). LIMITED UPPER ABDOMEN: Heavy calcified atheromatous plaque at the origin of the renal and mesenteric arteries. MUSCULOSKELETAL: Bones are demineralized.   CONCLUSION: 1. No pneumothorax. 2. Emphysema and chronic partially calcified cicatricial volume loss in the lungs unchanged. 3. Dense three-vessel coronary artery calcification. 4. Several small inferior anterior mediastinal lymph nodes have developed since 2010    CT ABDOMEN PELVIS WO ORAL W IV CONTRAST, CT CHEST W CONTRAST 12/3/2016 8:14 PM  INDICATION: Fall with left flank pain.  COMPARISON: Chest CT from 10/15/2016.  CHEST: Areas of volume loss, scarring, and calcification are again seen in the upper lobes of both lungs, unchanged from the prior exam. There is moderate centrilobular emphysema. The pleural spaces appear normal..  MEDIASTINUM: There is a small sliding hiatal hernia. A low dense lesion is again seen in the left lobe of the thyroid gland likely representing a benign colloid cyst. Left subclavian dual-lead pacemaker. Calcified atherosclerotic plaque in the thoracic   aorta and its major branches including coronary arteries. No lymphadenopathy.  ABDOMEN: Cholecystectomy. 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 The liver, spleen, adrenal glands, and pancreas appear normal. The kidneys appear mildly atrophic with multiple small low   dense lesions likely representing benign cysts. The abdominal aorta is normal in caliber with severe calcified atherosclerotic plaque and plaque at the origins of its major branches. Enlarged lymph nodes are seen in the gastrohepatic ligament measuring   up to 6 x 12 mm. Retroperitoneal lymph nodes are in the upper range of normal in size. Mesenteric lymph  nodes are mildly prominent in the upper range of normal. Diverticula are seen in the colon without evidence of diverticulitis.  PELVIS: There is no evidence of free air, free fluid, or fluid collection. Extensive colonic diverticulosis without evidence of diverticulitis. Densely calcified atherosclerotic plaque in the pelvic arteries.  MUSCULOSKELETAL: Degenerative changes at L4-L5. No fractures are identified.  CONCLUSION:  1.  Extensive colonic diverticulosis without evidence of diverticulitis.  2.  Areas of fibrosis, scarring, and calcification in both lungs, unchanged.  3.  Moderate centrilobular emphysema.  4.  Benign-appearing thyroid nodule unchanged.  5.  Densely calcified atherosclerotic plaque in the visualized arteries.  6.  Extensive colonic diverticulosis without evidence of diverticulitis.    XR CHEST PA AND LATERAL 5/31/2017 9:01 PM  INDICATION: SOB  COMPARISON: October 17, 2016, October 15, 2016 and chest CT December 3, 2016  FINDINGS: There is a dual-chamber pacemaker the heart and pulmonary vasculature are normal. An oval density at the left lung base represents a Bochdalek hernia as seen on the prior CT, this is a benign finding. There is some right suprahilar infiltrate or opacity and some focal opacity at the left apex but these are similar to the prior chest x-rays and correspond to focal and studies seen on the CT. An acute infiltrate is not seen

## 2021-06-04 VITALS
DIASTOLIC BLOOD PRESSURE: 60 MMHG | BODY MASS INDEX: 21.62 KG/M2 | HEIGHT: 63 IN | HEART RATE: 63 BPM | WEIGHT: 122 LBS | SYSTOLIC BLOOD PRESSURE: 110 MMHG | OXYGEN SATURATION: 92 % | RESPIRATION RATE: 12 BRPM

## 2021-06-04 VITALS
BODY MASS INDEX: 20.91 KG/M2 | HEART RATE: 66 BPM | DIASTOLIC BLOOD PRESSURE: 70 MMHG | HEIGHT: 63 IN | RESPIRATION RATE: 12 BRPM | SYSTOLIC BLOOD PRESSURE: 118 MMHG | WEIGHT: 118 LBS

## 2021-06-04 NOTE — PATIENT INSTRUCTIONS - HE
Tiffanie Sharma,    It was a pleasure to see you today at the [facility].     My recommendations after this visit include:    Decrease metoprolol succinate to 25 mg daily    Follow up in 1 year, or sooner if needed    Joceline Miner, [facility], Electrophysiology  [phone]  EP nurses [phone]

## 2021-06-04 NOTE — PROGRESS NOTES
In clinic device check with Device RN and Joceline Miner NP.  Please see link for full device report.  Patient was informed of results and next follow up during today's visit.

## 2021-06-08 NOTE — PROGRESS NOTES
Note from patient's pharmacy that previously ordered Famotidine (replacement for Zantac) is now on back order.  Asking for Rx for replacement.  Per Dr. Kathleen, will prescribe cimetidine 200mg daily.

## 2021-06-08 NOTE — TELEPHONE ENCOUNTER
PATIENT NAME:  Tiffanie Sharma  YOB: 1935  MRN: [id number]  SURGEON: Dr. Florence  DATE of CONSULT: 05/07/2020  Consult for:  ICH    FOLLOW-UP:  Follow up Visit: 2 weeks  Post-op Provider: Telephone Visit with NP  DIAGNOSTICS: CT Head          ADDITIONAL INSTRUCTIONS FOR MEDICAL STAFF:      Susanne Galvez RN, CNRN

## 2021-06-08 NOTE — TELEPHONE ENCOUNTER
5/15/20 CT scan already scheduled. LMTCB to schedule. Still need to schedule telephone NP visit after CT.

## 2021-06-08 NOTE — PROGRESS NOTES
"This patient is being evaluated via a billable telephone visit.      The patient has been notified of following:     \"This telephone visit will be conducted via a call between you and your physician/provider. We have found that certain health care needs can be provided without the need for a physical exam.  This service lets us provide the care you need with a short phone conversation.  If a prescription is necessary we can send it directly to your pharmacy.  If lab work is needed we can place an order for that and you can then stop by our lab to have the test done at a later time.    Telephone visits are billed at different rates depending on your insurance coverage. During this emergency period, for some insurers they may be billed the same as an in-person visit.  Please reach out to your insurance provider with any questions.    If during the course of the call the physician/provider feels a telephone visit is not appropriate, you will not be charged for this service.\"    Patient has given verbal consent to a Telephone visit? Yes        Reason for visit:  Follow-up visit with new head CT for left temporal IPH.    Pt presented to ED with altered mental status on 5/7/20 and was found to be supratherapeutic on coumadin with INR of 7.33, PTT 60. Head CT revealed a 1.6 x 2.1 x 2.3cm left anterior temporal lobe intraparenchymal hemorrhage with small focus of hypodensity within the hemorrhage with mild edema and mass effect without midline shift. INR was reversed and subsequent head CTs were stable. There was concern for possible underlying lesion and since pt has a pacemaker and is unable to have MRIs, a CTA was done which was unrevealing. Plan for outpatient follow up in 2wks with new head CT and another CTA at 4-6 weeks. Coumadin has been on hold and she is on keppra per neurology.    Surgeon is Ludivina    Subjective:  Patient feels well. States she is tired. Currently at TCU. Denies headache, dizziness, nausea, " numbness or tingling, change in vision, or weakness. She states she has not been out of bed. Does not really remember her hospital admission.     Spoke w pt's RN also who believes she has been clinically stable    Imaging:  Personally reviewed images and radiologist impressions  EXAM: CT HEAD WO CONTRAST  LOCATION: [facility]  DATE/TIME: 5/19/2020 11:44 AM     INDICATION: Follow-up intracranial hemorrhage  COMPARISON: CTA head 05/07/2020  TECHNIQUE: Routine without IV contrast. Multiplanar reformats. Dose reduction techniques were used.     FINDINGS:  INTRACRANIAL CONTENTS: Subacute intraparenchymal hemorrhage within the anterior left temporal lobe. Hyperdense blood products currently measure 11 x 13 x 11 mm in oblique transverse, AP, and craniocaudal dimensions respectively compared to 18 x 20 x 18   mm on the prior exam. Surrounding low-attenuation edema has slightly progressed. Local sulcal effacement without associated shift of midline structures. No new hemorrhage or increasing mass effect identified. Chronic focus of cortical encephalomalacia   involving the right superior parietal lobule Mild presumed chronic small vessel ischemic changes. Mild generalized volume loss. No hydrocephalus.      VISUALIZED ORBITS/SINUSES/MASTOIDS: Prior bilateral cataract surgery. Visualized portions of the orbits are otherwise unremarkable. No paranasal sinus mucosal disease. 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 No middle ear or mastoid effusion.     BONES/SOFT TISSUES: No acute abnormality.  .  IMPRESSION:   1.  Subacute intraparenchymal hemorrhage involving the anterior left temporal lobe with interval decrease in the size of hyperdense blood products as above.  2.  No new hemorrhage or increasing mass effect.  3.  Generalized brain atrophy and presumed chronic microvascular ischemic changes similar to findings on the previous exam.    Diagnosis:  Left temporal intraparenchymal hemorhage    Assessment/Plan:  CT  shows improvement of left temporal IPH without new hemorrhage. Ongoing local edema, slightly increased. We do still have some concern for potential underlying lesion.  Continue keppra.  OK to start asa 81mg daily at this time (recommended by neurology as documented during her inpatient stay).  Continue to hold coumadin.  Plan for CTA head (pt cannot have MRI) in 2 more wks to r/o underlying lesion with virtual appt with NP   After CTA results available, we will consider whether to resume coumadin.     Spoke w pt's MD at TCU regarding plan, states she has been somewhat confused on and off but otherwise stable exam    I have reviewed and updated the patient's Past Medical History, Social History, Family History and Medication List.  Patient's allergies were reviewed.      Phone call duration: 15 minutes    Emma KAHN  [facility] Neurosurgery  O. [phone]

## 2021-06-09 NOTE — PROGRESS NOTES
INR 1.1 pt does not know why it is so low. She does not have a weekly med box so feels that the bottle of Warfarin is in the bunch but will go home and check. Will call if she is out. Retest in one week on current dose. 4 mg thurs and 2 mg all other days. After talking with pt and discussing history of greens/salads and medication change. Pt will  continue  with current diet and dosing of Warfarin.  Continue with moderation of Vit K and green leafy vegetables. Cautioned to call with increase bruising or bleeding. Reminded to call with medication change especially antibiotic. Call with any questions or concerns or any up coming procedures. Cautioned about using Herbal medication.

## 2021-06-09 NOTE — PROGRESS NOTES
INR low at 1.1 will increase dose to 4 mg daily and retest in one week. Pt will bring bottle next week to confirm dose. Decrease greens. After talking with pt and discussing history of greens/salads and medication change. Pt will  continue  with current diet and dosing of Warfarin.  Continue with moderation of Vit K and green leafy vegetables. Cautioned to call with increase bruising or bleeding. Reminded to call with medication change especially antibiotic. Call with any questions or concerns or any up coming procedures. Cautioned about using Herbal medication.

## 2021-06-09 NOTE — PROGRESS NOTES
INR 2.3 will continue at 4 mg daily of Warfarin and retest in 2 weeks. Can increase greens to 2-3 times weekly. After talking with pt and discussing history of greens/salads and medication change. Pt will  continue  with current diet and dosing of Warfarin.  Continue with moderation of Vit K and green leafy vegetables. Cautioned to call with increase bruising or bleeding. Reminded to call with medication change especially antibiotic. Call with any questions or concerns or any up coming procedures. Cautioned about using Herbal medication.

## 2021-06-11 NOTE — PROGRESS NOTES
INR 1.7 will increase back to dose of 4 mg daily. Pt is off all steroids and antibiotics. Retest in one week. After talking with pt and discussing history of greens/salads and medication change. Pt will  continue  with current diet and dosing of Warfarin.  Continue with moderation of Vit K and green leafy vegetables. Cautioned to call with increase bruising or bleeding. Reminded to call with medication change especially antibiotic. Call with any questions or concerns or any up coming procedures. Cautioned about using Herbal medication.

## 2021-06-11 NOTE — PROGRESS NOTES
INR 1.5 will continue at 2 mg daily after holding doses last week. Will retest 7/12. Moderated greens. After talking with pt and discussing history of greens/salads and medication change. Pt will  continue  with current diet and dosing of Warfarin.  Continue with moderation of Vit K and green leafy vegetables. Cautioned to call with increase bruising or bleeding. Reminded to call with medication change especially antibiotic. Call with any questions or concerns or any up coming procedures. Cautioned about using Herbal medication.

## 2021-06-11 NOTE — PROGRESS NOTES
INR 4.9 off antibiotics and Steroids. Hold Warfarin Tue and Wed and 2 mg all other days. Retest in 1 week. After talking with pt and discussing history of greens/salads and medication change. Pt will  continue  with current diet and dosing of Warfarin.  Continue with moderation of Vit K and green leafy vegetables. Cautioned to call with increase bruising or bleeding. Reminded to call with medication change especially antibiotic. Call with any questions or concerns or any up coming procedures. Cautioned about using Herbal medication.

## 2021-06-11 NOTE — PROGRESS NOTES
INR 1.4 will increase dose to 4 mg M,W,F and 2 mg all other days. After talking with pt and discussing history of greens/salads and medication change. Pt will  continue  with current diet and dosing of Warfarin.  Continue with moderation of Vit K and green leafy vegetables. Cautioned to call with increase bruising or bleeding. Reminded to call with medication change especially antibiotic. Call with any questions or concerns or any up coming procedures. Cautioned about using Herbal medication.

## 2021-06-11 NOTE — PROGRESS NOTES
INR at 8. Will hold warfarin for 2 days then decrease dose to 2 mg daily. Increase greens. Pt is now off Prednisone and feeling better. After talking with pt and discussing history of greens/salads and medication change. Pt will  continue  with current diet and dosing of Warfarin.  Continue with moderation of Vit K and green leafy vegetables. Cautioned to call with increase bruising or bleeding. Reminded to call with medication change especially antibiotic. Call with any questions or concerns or any up coming procedures. Cautioned about using Herbal medication.

## 2021-06-11 NOTE — PROGRESS NOTES
INR 3.4 starting predisone for a lung congestion. Will alternate 2 mg and 4 mg over the next week and retest in one week. Will increase greens. After talking with pt and discussing history of greens/salads and medication change. Pt will  continue  with current diet and dosing of Warfarin.  Continue with moderation of Vit K and green leafy vegetables. Cautioned to call with increase bruising or bleeding. Reminded to call with medication change especially antibiotic. Call with any questions or concerns or any up coming procedures. Cautioned about using Herbal medication.

## 2021-06-12 NOTE — PROGRESS NOTES
INR 1.7 increase dose to 4 mg 4/7 and 2 mg 3/7 . Retest in 2 weeks. After talking with pt and discussing history of greens/salads and medication change. Pt will  continue  with current diet and dosing of Warfarin.  Continue with moderation of Vit K and green leafy vegetables. Cautioned to call with increase bruising or bleeding. Reminded to call with medication change especially antibiotic. Call with any questions or concerns or any up coming procedures. Cautioned about using Herbal medication.

## 2021-06-12 NOTE — PROGRESS NOTES
INR 2.4 After talking with pt and discussing history of greens/salads and medication change. Pt will  continue  with current diet and dosing of Warfarin.  Continue with moderation of Vit K and green leafy vegetables. Cautioned to call with increase bruising or bleeding. Reminded to call with medication change especially antibiotic. Call with any questions or concerns or any up coming procedures. Cautioned about using Herbal medication.

## 2021-06-13 NOTE — PROGRESS NOTES
INR 1.2 missed several doses. Will increase dose to 4 mg several days and retest in one week. After talking with pt and discussing history of greens/salads and medication change. Pt will  continue  with current diet and dosing of Warfarin.  Continue with moderation of Vit K and green leafy vegetables. Cautioned to call with increase bruising or bleeding. Reminded to call with medication change especially antibiotic. Call with any questions or concerns or any up coming procedures. Cautioned about using Herbal medication.

## 2021-06-14 NOTE — PROGRESS NOTES
PULMONARY OUTPATIENT FOLLOW UP NOTE     Assessment:   1. Severe COPD on home O2  FEV1 31% (2013)  Patient did not tolerate LABA/ICS/LAMA due to different reported size effects.  Doing well on albuterol HFA 4 times a day.   No exacerbations over the last two years.   Exertional hypoxia, continue O2 supplementation 2 LPM with activities and at night  Reports having flu vaccine this year  2. GERD  Raise the head of the bed, avoid eating close to bed time. Continue PPI daily  3. HTN, CHF diastolic dysfunction   4. Hx of atrial fibrillation, anticoagulated  5. Hx of breast cancer s/p R lumpectomy and radiation therapy      Plan:   1. O2 supplementation 2 LPM with activities and at night  2. Albuterol HFA as needed  3. Raise the head of the bed  4. Avoid eating close to bed time  5. Protonix daily   6. Increase physical activity  7. Follow up in 12 months.      Moshe Ravi  Pulmonary / Critical Care  11/15/2017        CC:            Chief Complaint   Patient presents with     Follow Up      HPI:        Tiffanie Sharma is an 82 y.o. female with history of COPD on home O2, Breast cancer dx 2009 s/p right lumpectomy and radiation therapy, HTN, A fib/ a flutter anticoagulated, s/p pacemaker.   Pt is here for follow up appointment. Last visit over two years ago.  Since last visit, denies being treated for COPD exacerbation.   Able to walk close to a block before feeling short of breath, uses her O2 supplementation at night and with activities.  Denies orthopnea, no PND, no swelling of LE, no chest pain.   Pt repors developed hoarseness and arthralgias with symbicort, advair, flovent, dulera, breo. Tried spiriva and developed hoarseness, dry mouth and arthralgias.  Uses albuterol two puffs 4 times a day.   Denies acid reflux symptoms while taking pantoprazole 40 mg daily.   Weight loss quantified in 7 lbs since 2015.   Pt has history of tobacco use, 2 ppd for 50 + years, quit 6 years     PMH  1. Esophageal  Reflux 530.81   2. Hyperlipidemia 272.4   3. Coronary Artery Disease 414.00   4. HTN  5. CHF diastolic dysfunction  6. COPD on O2 supplementation 2 LPM at night and with activities   7. Atrial Flutter / Atrial Fibrillation        Current Outpatient Prescriptions:      alendronate (FOSAMAX) 70 MG tablet, Take 70 mg by mouth every 7 days. , Disp: , Rfl:      aspirin 81 MG EC tablet, Take 81 mg by mouth daily., Disp: , Rfl:      calcium-vitamin D (CALCIUM-VITAMIN D) 500 mg(1,250mg) -200 unit per tablet, Take 1 tablet by mouth 2 (two) times a day with meals., Disp: , Rfl:      cholecalciferol, vitamin D3, 1,000 unit tablet, Take 1,000 Units by mouth daily., Disp: , Rfl:      citalopram (CELEXA) 10 MG tablet, Take 10 mg by mouth daily. , Disp: , Rfl:      clobetasol (TEMOVATE) 0.05 % cream, Apply 1 application topically 2 (two) times a day as needed. , Disp: , Rfl:      digoxin (LANOXIN) 125 mcg tablet, TAKE ONE TABLET BY MOUTH DAILY, Disp: 90 tablet, Rfl: 1     gabapentin (NEURONTIN) 800 MG tablet, , Disp: , Rfl: 1     GLUC INIGUEZ/CHONDRO INIGUEZ A/VIT C/MN (GLUCOSAMINE 1500 COMPLEX ORAL), Take 1,500 mg by mouth daily. , Disp: , Rfl:      metoprolol (TOPROL-XL) 100 MG 24 hr tablet, Take 100 mg by mouth daily. , Disp: , Rfl:      multivitamin with minerals tablet, Take 1 tablet by mouth daily., Disp: , Rfl:      pantoprazole (PROTONIX) 40 MG tablet, Take 1 tablet (40 mg total) by mouth daily., Disp: 30 tablet, Rfl: 6     PROAIR HFA 90 mcg/actuation inhaler, Inhale 2 puffs every 6 (six) hours as needed. , Disp: , Rfl:      simvastatin (ZOCOR) 40 MG tablet, Take 40 mg by mouth bedtime. , Disp: , Rfl:      traZODone (DESYREL) 50 MG tablet, Take 50 mg by mouth bedtime. , Disp: , Rfl:      triamcinolone (KENALOG) 0.5 % cream, Apply topically 3 (three) times a day., Disp: , Rfl:      warfarin (COUMADIN) 2 MG tablet, 2-4 mg See Admin Instructions. Takes 4 mg on Thursday and 2 mg the rest of the week, Disp: , Rfl:      Social History      Social History     Marital status:      Spouse name: N/A     Number of children: N/A     Years of education: N/A     Occupational History     Not on file.     Social History Main Topics     Smoking status: Former Smoker     Quit date: 11/1/2010     Smokeless tobacco: Never Used     Alcohol use No     Drug use: No     Sexual activity: Not on file     Other Topics Concern     Not on file     Social History Narrative     Family History   Problem Relation Age of Onset     No Medical Problems Mother       No Medical Problems Father       No Medical Problems Sister       No Medical Problems Daughter       No Medical Problems Maternal Grandmother       No Medical Problems Maternal Grandfather       No Medical Problems Paternal Grandmother       No Medical Problems Paternal Grandfather       No Medical Problems Maternal Aunt       No Medical Problems Paternal Aunt       BRCA 1/2 Neg Hx       Breast cancer Neg Hx       Cancer Neg Hx       Colon cancer Neg Hx       Endometrial cancer Neg Hx       Ovarian cancer Neg Hx        Review of Systems  A 12 point comprehensive review of systems was negative except as noted.      Objective:      Vitals:    11/15/17 1013   BP: 138/62   Pulse: 92   Resp: 18   SpO2: 96%   Weight: 155 lb 6.4 oz (70.5 kg)   Height: 5' (1.524 m)    Patient oxygen saturation on RA at rest is 94%.   Oxygen saturation after ambulating 300ft on RA is 87%.    After ambulating 300ft on 2LPM oxygen saturation is 92%.    Gen: awake, alert, no distress  HEENT: pale conjunctiva, moist mucosa  Neck; no thyromegaly, masses or JVD  Lungs; decrease BS both hemithorax, prolonged expiration  CV: irregular, no murmurs or gallops appreciated  Abd: soft, distended, BS wnl  Ext: no edema      Diagnostic tests:      PFTs (12/11/2013)   FVC 1.58 L (59%) FEV1 0.62 l (31 %) FEV1/FVC 39% Significant postbronchodilator response FVC 1.85 L (increase in 16%)   TLC 7.31 L (149%) RV 5.40 l (239%)   DLCO 44% and DLCO/VA 66%       Echocardiogram (5/13/2014)   Normal LV size and function, EF 79%, no regional wall motion abnormality, diastolic dysfunction grade 3, mild LVHT, mild MS, RV systolic function is mildly reduced.     Chest CT scan (11/16/13)   LUNGS AND PLEURA: Lungs are hyperinflated WITH emphysematous change. Left-sided pneumothorax is completely resolved. Small bilateral pleural effusions with mild bibasilar atelectasis posteriorly. Benign stable partially calcified chronic consolidation and cicatricial volume loss in the posterior segment right upper lobe, apical posterior segment left upper lobe and superior segments of both lower lobes. MEDIASTINUM: Dense coronary artery calcification. Mild enlargement central pulmonary arteries is associated with some degree of pulmonary arterial hypertension. Benign circumscribed hypodense focus left thyroid lobe has decreased in size from 2010 therefore benign. Small lymph nodes in the anterior mediastinal fat adjacent to the inferior heart have developed (image 74 of series 4). LIMITED UPPER ABDOMEN: Heavy calcified atheromatous plaque at the origin of the renal and mesenteric arteries. MUSCULOSKELETAL: Bones are demineralized.   CONCLUSION: 1. No pneumothorax. 2. Emphysema and chronic partially calcified cicatricial volume loss in the lungs unchanged. 3. Dense three-vessel coronary artery calcification. 4. Several small inferior anterior mediastinal lymph nodes have developed since 2010    CT ABDOMEN PELVIS WO ORAL W IV CONTRAST, CT CHEST W CONTRAST 12/3/2016 8:14 PM  INDICATION: Fall with left flank pain.  COMPARISON: Chest CT from 10/15/2016.  CHEST: Areas of volume loss, scarring, and calcification are again seen in the upper lobes of both lungs, unchanged from the prior exam. There is moderate centrilobular emphysema. The pleural spaces appear normal..  MEDIASTINUM: There is a small sliding hiatal hernia. A low dense lesion is again seen in the left lobe of the thyroid gland likely  representing a benign colloid cyst. Left subclavian dual-lead pacemaker. Calcified atherosclerotic plaque in the thoracic   aorta and its major branches including coronary arteries. No lymphadenopathy.  ABDOMEN: Cholecystectomy. 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 The liver, spleen, adrenal glands, and pancreas appear normal. The kidneys appear mildly atrophic with multiple small low   dense lesions likely representing benign cysts. The abdominal aorta is normal in caliber with severe calcified atherosclerotic plaque and plaque at the origins of its major branches. Enlarged lymph nodes are seen in the gastrohepatic ligament measuring   up to 6 x 12 mm. Retroperitoneal lymph nodes are in the upper range of normal in size. Mesenteric lymph nodes are mildly prominent in the upper range of normal. Diverticula are seen in the colon without evidence of diverticulitis.  PELVIS: There is no evidence of free air, free fluid, or fluid collection. Extensive colonic diverticulosis without evidence of diverticulitis. Densely calcified atherosclerotic plaque in the pelvic arteries.  MUSCULOSKELETAL: Degenerative changes at L4-L5. No fractures are identified.  CONCLUSION:  1.  Extensive colonic diverticulosis without evidence of diverticulitis.  2.  Areas of fibrosis, scarring, and calcification in both lungs, unchanged.  3.  Moderate centrilobular emphysema.  4.  Benign-appearing thyroid nodule unchanged.  5.  Densely calcified atherosclerotic plaque in the visualized arteries.  6.  Extensive colonic diverticulosis without evidence of diverticulitis.    XR CHEST PA AND LATERAL 5/31/2017 9:01 PM  INDICATION: SOB  COMPARISON: October 17, 2016, October 15, 2016 and chest CT December 3, 2016  FINDINGS: There is a dual-chamber pacemaker the heart and pulmonary vasculature are normal. An oval density at the left lung base represents a Bochdalek hernia as seen on the  prior CT, this is a benign finding. There is some right suprahilar infiltrate   or opacity and some focal opacity at the left apex but these are similar to the prior chest x-rays and correspond to focal and studies seen on the CT. An acute infiltrate is not seen

## 2021-06-14 NOTE — PROGRESS NOTES
INR 2.1 After talking with pt and discussing history of greens/salads and medication change. Pt will  continue  with current diet and dosing of Warfarin.  Continue with moderation of Vit K and green leafy vegetables. Cautioned to call with increase bruising or bleeding. Reminded to call with medication change especially antibiotic. Call with any questions or concerns or any up coming procedures. Cautioned about using Herbal medication.  2 mg daily

## 2021-06-14 NOTE — PROGRESS NOTES
INR 3.9 will decrease dose to 2 mg daily and retest in one week. After talking with pt and discussing history of greens/salads and medication change. Pt will  continue  with current diet and dosing of Warfarin.  Continue with moderation of Vit K and green leafy vegetables. Cautioned to call with increase bruising or bleeding. Reminded to call with medication change especially antibiotic. Call with any questions or concerns or any up coming procedures. Cautioned about using Herbal medication.

## 2021-06-14 NOTE — PROGRESS NOTES
INR 1.7 retest in 2 weeks and increase Warfarin to 4 mg Wed and Sat and 2 mg all other days. After talking with pt and discussing history of greens/salads and medication change. Pt will  continue  with current diet and dosing of Warfarin.  Continue with moderation of Vit K and green leafy vegetables. Cautioned to call with increase bruising or bleeding. Reminded to call with medication change especially antibiotic. Call with any questions or concerns or any up coming procedures. Cautioned about using Herbal medication.  Pt is on O2 and steroids.

## 2021-06-14 NOTE — PROGRESS NOTES
Patient oxygen saturation on RA at rest is 94%.  Oxygen saturation after ambulating 300ft on RA is 87%.    After ambulating 300ft on 2LPM oxygen saturation is 92%.    Patient is ambulatory within his/her home.

## 2021-06-15 NOTE — PROGRESS NOTES
[facility]    Assessment/Plan:      Problem List Items Addressed This Visit     Coronary artery disease    Atrial fibrillation, chronic - Primary    Relevant Orders    Digoxin (Lanoxin )    Cardiac pacemaker, dual, in situ        1.  Chronic atrial fibrillation and flutter:  Ventricular response is controlled with metoprolol succinate 100 mg daily and digoxin 125  g daily.  Most recent labs were remarkable, but digoxin level was not done, so one was drawn today.  She has a [id number] score of 4 for age > 75, female gender, and coronary artery disease; she is on warfarin for stroke prophylaxis.  INR was subtherapeutic at 1.7 today.  Warfarin increased to 4 mg on Wednesdays and Saturdays with 2 mg ROW.  She is scheduled to have her INR rechecked on 1/18/2018.      2.  Sick sinus syndrome status post dual-chamber pacemaker implant: The pacemaker was interrogated and is functioning appropriately.  The battery shows estimated longevity of 7-8 years.  Atrial and ventricular lead sensing, impedance, and pacing thresholds are stable and within normal limits.  Remote interrogation every 3 months.    3.  Coronary artery disease: No symptoms indicative of myocardial ischemia.    Follow-up in 1 year.    Subjective:      Tiffanie Sharma, a very pleasant 82-year-old woman, comes in today for device and arrhythmia follow-up.  She has a longstanding history of paroxysmal atrial fibrillation that progressed to persistent atrial fibrillation and has failed treatment with Tikosyn and sotalol. As she is asymptomatic, a rate control strategy was implemented. In June 2014, she was in 2:1 atrial flutter with rapid ventricular response and atrial burst paced via back into atrial fibrillation with a ventricular rate of 100 bpm.  Her atrial fibrillation and flutter has been persistent since February 2015, now considered chronic.  Her metoprolol succinate was increased to 100 mg daily; her afternoon fatigue improved with  taking it at night.  Digoxin 125  g daily was added with improved rate control.  She is on warfarin for stroke prophylaxis with no bleeding issues.  She underwent dual-chamber pacemaker implant 11/25/2013 for tachy-shanique syndrome. She has a history of esophageal reflux, hyperlipidemia, breast cancer s/p lumpectomy and radiation, spontaneous pneumothorax and coronary artery disease; she was noted to have heavily calcified coronary arteries on CT, though she has never had any symptoms of angina. She also has a history of COPD and is now on supplemental oxygen.      Tiffanie states that she has been feeling very well.  She denies chest pain, palpitations, abdominal fullness/bloating or peripheral edema, shortness of breath at rest, paroxysmal nocturnal dyspnea, orthopnea, lightheadedness, dizziness, pre-syncope, or syncope.     Medical, surgical, family, social history, and medications were reviewed and updated as necessary.    Patient Active Problem List   Diagnosis     Coronary artery disease     Atrial fibrillation, chronic     Cardiac pacemaker, dual, in situ     COPD (chronic obstructive pulmonary disease)     Pneumothorax     Secondary spontaneous pneumothorax     Complicated bereavement     Depression     Back pain     Chronic respiratory failure with hypoxia     Anticoagulation adequate     Fall at home, initial encounter       Past Medical History:   Diagnosis Date     Atrial fibrillation      Atrial flutter     Created by Conversion      Breast cancer 2009     Breast cyst 1980    at drs office benign     COPD (chronic obstructive pulmonary disease) 5/7/2015     Coronary Artery Disease     Created by Conversion  Replacement Utility updated for latest IMO load     Esophageal reflux     Created by Conversion      Hx of radiation therapy      Hyperlipidemia     Created by Conversion      Pneumothorax      Sick Sinus Syndrome     Created by Conversion        Past Surgical History:   Procedure Laterality Date      BREAST BIOPSY Right 2009    cancer     BREAST CYST ASPIRATION       BREAST CYST EXCISION       AR EXCISE BREAST CYST      Description: Breast Surgery Lumpectomy;  Recorded: 12/15/2013;       Allergies   Allergen Reactions     Acetaminophen-Codeine      Advair Diskus [Fluticasone-Salmeterol] Other (See Comments)     Pt c/o muscle and bone paing.  Cramping, hoarseness, and dry mouth.     Flovent [Fluticasone] Other (See Comments)     Pt c/o muscle and bone pain.  Cramping, hoarseness, and dry mouth.     Ibuprofen      Nsaids (Non-Steroidal Anti-Inflammatory Drug)      Symbicort [Budesonide-Formoterol] Other (See Comments)     Pt c/o muscle and bone pain.  Cramping, hoarseness, and dry mouth       Current Outpatient Prescriptions   Medication Sig Dispense Refill     aspirin 81 MG EC tablet Take 81 mg by mouth daily.       calcium-vitamin D (CALCIUM-VITAMIN D) 500 mg(1,250mg) -200 unit per tablet Take 1 tablet by mouth 2 (two) times a day with meals.       cholecalciferol, vitamin D3, 1,000 unit tablet Take 1,000 Units by mouth daily.       citalopram (CELEXA) 10 MG tablet Take 10 mg by mouth daily.        clobetasol (TEMOVATE) 0.05 % cream Apply 1 application topically 2 (two) times a day as needed.        digoxin (LANOXIN) 125 mcg tablet TAKE ONE TABLET BY MOUTH DAILY 90 tablet 0     gabapentin (NEURONTIN) 800 MG tablet Take 800 mg by mouth 2 (two) times a day.   1     GLUC INIGUEZ/CHONDRO INIGUEZ A/VIT C/MN (GLUCOSAMINE 1500 COMPLEX ORAL) Take 1,500 mg by mouth daily.        metoprolol (TOPROL-XL) 100 MG 24 hr tablet Take 100 mg by mouth daily.        multivitamin with minerals tablet Take 1 tablet by mouth daily.       PROAIR HFA 90 mcg/actuation inhaler Inhale 2 puffs every 6 (six) hours as needed.        simvastatin (ZOCOR) 40 MG tablet Take 40 mg by mouth bedtime.        traZODone (DESYREL) 50 MG tablet Take 50 mg by mouth bedtime.        triamcinolone (KENALOG) 0.5 % cream Apply topically 3 (three) times a day.        warfarin (COUMADIN) 2 MG tablet 2-4 mg See Admin Instructions. Takes 4 mg on Thursday and 2 mg the rest of the week       alendronate (FOSAMAX) 70 MG tablet Take 70 mg by mouth every 7 days.        No current facility-administered medications for this visit.        Family History   Problem Relation Age of Onset     No Medical Problems Mother      No Medical Problems Father      No Medical Problems Sister      No Medical Problems Daughter      No Medical Problems Maternal Grandmother      No Medical Problems Maternal Grandfather      No Medical Problems Paternal Grandmother      No Medical Problems Paternal Grandfather      No Medical Problems Maternal Aunt      No Medical Problems Paternal Aunt      BRCA 1/2 Neg Hx      Breast cancer Neg Hx      Cancer Neg Hx      Colon cancer Neg Hx      Endometrial cancer Neg Hx      Ovarian cancer Neg Hx        Social History   Substance Use Topics     Smoking status: Former Smoker     Quit date: 11/1/2010     Smokeless tobacco: Never Used     Alcohol use No       Review of Systems:   General: WNL  Eyes: WNL  Ears/Nose/Throat: WNL  Lungs: WNL  Heart: WNL  Stomach: WNL  Bladder: WNL  Muscle/Joints: WNL  Skin: WNL  Nervous System: WNL  Mental Health: WNL     Blood: WNL      Objective:      /64 (Patient Site: Left Arm)  Pulse 64  Resp 16  Ht 5' (1.524 m)  Wt 147 lb (66.7 kg)  BMI 28.71 kg/m2    Physical Exam  General Appearance:  Alert, well-appearing, in no acute distress.     HEENT:  Atraumatic, normocephalic; no scleral icterus, EOM intact; the mucous membranes were pink and moist.   Chest:  Chest symmetric, spine straight.  Pacemaker pocket with no signs of infection or tissue thinning.     Lungs:   Respirations unlabored; the lungs are clear to auscultation.   Cardiovascular:   Auscultation reveals normal first and second heart sounds with no murmurs, rubs, or gallops.  Irregularly irregular rate and rhythm. No JVD.  Radial and posterior tibial pulses are intact.     Abdomen:  Soft, nontender, nondistended, bowel sounds present.     Extremities: No cyanosis, clubbing, or edema.   Musculoskeletal:  Moves all extremities.     Skin: No xanthelasma.   Neurologic: Mood and affect are appropriate. Oriented to person, place, time, and situation.       Cardiographics (personally reviewed)  Pacemaker Interrogation:  Pacing mode: DDDR  bpm  Presenting rhythm: AF-VS   Underlying rhythm: A fib with VR  bpm  A-paced: 0%.  V-paced 3%.  Battery: estimated longevity 7-8 years.  Atrial and Ventricular leads: Stable with good sensing, impedance, and pacing threshold  Arrhythmias: Persistent atrial fibrillation since February2015 with controlled ventricular response.  Occasional RVR with vR > 120 10-15%.  Histograms: Good rate distribution    Lab Review   Lab Results   Component Value Date    CREATININE 0.87 05/31/2017    BUN 19 05/31/2017     05/31/2017    K 4.5 05/31/2017     (H) 05/31/2017    CO2 27 05/31/2017     Lab Results   Component Value Date    INR 1.7 01/04/2018    INR 1.7 12/20/2017    INR 2.1 12/06/2017   Warfarin increased to 4 mg on Wednesdays and Saturdays with 2 mg ROW.    Digoxin level drawn today, results pending.      Joceline Miner, [facility], Electrophysiology  [phone]    This note has been dictated using voice recognition software. Any grammatical, typographical, or context distortions are unintentional and inherent to the software.

## 2021-06-15 NOTE — PROGRESS NOTES
INR 1.7 pt was taking 2 mg daily. Will increase dose to 4 mg Wed and Sat and print calendar for her to follow. Retest in 2 weeks. After talking with pt and discussing history of greens/salads and medication change. Pt will  continue  with current diet and dosing of Warfarin.  Continue with moderation of Vit K and green leafy vegetables. Cautioned to call with increase bruising or bleeding. Reminded to call with medication change especially antibiotic. Call with any questions or concerns or any up coming procedures. Cautioned about using Herbal medication.

## 2021-06-15 NOTE — PROGRESS NOTES
INR 3.6 After talking with pt and discussing history of greens/salads and medication change. Pt will  continue  with current diet and dosing of Warfarin.  Continue with moderation of Vit K and green leafy vegetables. Cautioned to call with increase bruising or bleeding. Reminded to call with medication change especially antibiotic. Call with any questions or concerns or any up coming procedures. Cautioned about using Herbal medication.

## 2021-06-15 NOTE — PROGRESS NOTES
INR doing better. Will continue on 2 mg daily for 2 weeks and retest. After talking with pt and discussing history of greens/salads and medication change. Pt will  continue  with current diet and dosing of Warfarin.  Continue with moderation of Vit K and green leafy vegetables. Cautioned to call with increase bruising or bleeding. Reminded to call with medication change especially antibiotic. Call with any questions or concerns or any up coming procedures. Cautioned about using Herbal medication.

## 2021-06-15 NOTE — PROGRESS NOTES
In clinic device check with Device Nurse followed by office visit with Joceline Miner NP.  Please see link for full device report.  Patient was informed of results and next follow up during today's visit.

## 2021-06-16 PROBLEM — S06.33AA INTRAPARENCHYMAL HEMATOMA OF BRAIN (H): Status: ACTIVE | Noted: 2020-01-01

## 2021-06-16 PROBLEM — Z86.79 HX OF HEART FAILURE: Status: ACTIVE | Noted: 2020-01-01

## 2021-06-16 PROBLEM — E87.20 LACTIC ACIDOSIS: Status: ACTIVE | Noted: 2020-01-01

## 2021-06-16 PROBLEM — I48.0 PAROXYSMAL ATRIAL FIBRILLATION (H): Status: ACTIVE | Noted: 2020-01-01

## 2021-06-16 PROBLEM — E87.5 HYPERKALEMIA: Status: ACTIVE | Noted: 2020-01-01

## 2021-06-17 NOTE — PROGRESS NOTES
INR 1.7 increase dose to 4 mg Wed and 2 mg all other days. Retest in 2 weeks. After talking with pt and discussing history of greens/salads and medication change. Pt will  continue  with current diet and dosing of Warfarin.  Continue with moderation of Vit K and green leafy vegetables. Cautioned to call with increase bruising or bleeding. Reminded to call with medication change especially antibiotic. Call with any questions or concerns or any up coming procedures. Cautioned about using Herbal medication.

## 2021-06-17 NOTE — PROGRESS NOTES
INR 1.3 After talking with pt and discussing history of greens/salads and medication change. Pt will  continue  with current diet and dosing of Warfarin.  Continue with moderation of Vit K and green leafy vegetables. Cautioned to call with increase bruising or bleeding. Reminded to call with medication change especially antibiotic. Call with any questions or concerns or any up coming procedures. Cautioned about using Herbal medication.  Pt states she may have missed some doses. Will continue on 2 mg daily and retest in one week.

## 2021-06-17 NOTE — PROGRESS NOTES
INR 1.5 pt missed 3 days of Warfarin. Will put back on regular dose and stressed to take Warfarin. After talking with pt and discussing history of greens/salads and medication change. Pt will  continue  with current diet and dosing of Warfarin.  Continue with moderation of Vit K and green leafy vegetables. Cautioned to call with increase bruising or bleeding. Reminded to call with medication change especially antibiotic. Call with any questions or concerns or any up coming procedures. Cautioned about using Herbal medication.  Printed calendar.

## 2021-06-17 NOTE — PROGRESS NOTES
INR 2.1 pt took medication as ordered. Continue current management dosing of Warfarin. Continue  diet of moderate Vitamin K intake. Discussed with pt the need to call with questions or concerns or any change in medication especially herbal medication or OTC. Call with increased bleeding or bruising or any upcoming procedures.

## 2021-06-18 NOTE — PROGRESS NOTES
INR 1.4 pt forgetting dose at meal time. Will change to bedtime with other meds. After talking with pt and discussing history of greens/salads and medication change. Pt will  continue  with current diet and dosing of Warfarin.  Continue with moderation of Vit K and green leafy vegetables. Cautioned to call with increase bruising or bleeding. Reminded to call with medication change especially antibiotic. Call with any questions or concerns or any up coming procedures. Cautioned about using Herbal medication.  Retest in one week with increase to 4 mg M,W,F and 2 mg all other days.

## 2021-06-18 NOTE — PROGRESS NOTES
IR 1.6 increase dose to 4 mg WEd and Sat and 2 mg all other days. After talking with pt and discussing history of greens/salads and medication change. Pt will  continue  with current diet and dosing of Warfarin.  Continue with moderation of Vit K and green leafy vegetables. Cautioned to call with increase bruising or bleeding. Reminded to call with medication change especially antibiotic. Call with any questions or concerns or any up coming procedures. Cautioned about using Herbal medication.

## 2021-06-18 NOTE — PROGRESS NOTES
INR 1.2 pt is not taking her Warfarin as she should. Asked her to take one tab a day and to get her daughter in law involved to help her make she she takes it. She agrees with plan and dosing calendar printed.

## 2021-06-19 NOTE — LETTER
Letter by Kandice Moore at      Author: Kandice Moore Service: -- Author Type: --    Filed:  Encounter Date: 3/21/2019 Status: (Other)         Tiffanie Sharma  [address] [facility] [id number]      March 21, 2019      Dear Ms. Sharma,    RE: Remote Results    We are writing to you regarding your recent Remote Pacemaker check from home. Your transmission was received successfully. Battery status is satisfactory at this time.     Your results are being reviewed.    Your next device appointment will be a remote check on June 24, 2019; this will occur automatically.    To schedule or reschedule, please call [phone] and press 1.    NOTE: If you would like to do an extra transmission, please call [phone] and press 3 to speak to a nurse BEFORE transmitting. This ensures that the Device Clinic staff is aware of the reason you are sending a transmission, and can follow-up with you after it has been reviewed.    We will be checking your implanted device from home (remotely) every three months unless otherwise instructed. We will need to see you in the clinic at least once a year. You may need to be seen in the clinic sooner depending on the results of your check.    Please be aware:    The follow-up schedule is like a Physician prescription.    Your remote monitor is paired to your specific implanted device.      Sincerely,    [facility]

## 2021-06-19 NOTE — LETTER
Letter by Carmita Mcintyre RN at      Author: Carmita Mcintyre RN Service: -- Author Type: --    Filed:  Encounter Date: 8/9/2019 Status: (Other)       Tiffanie M Sravanipalmira  [address] [address]                          Dear Tiffanie Sharma,    Important Information for Your Procedure    You are having a Ablation Procedure:    Your procedure is scheduled for Friday 8/16/19    Please arrive at 10:00 am for registration and preporation for your procedure.   Getting Ready for Your Procedure:    Your recent office visit with the Cardiologist qualifies as your pre-operative history and physical prior to your procedure.    You will need to bring a current list of your medications with you for our admissions process the day of your procedure, please do not bring any of your own medications  with you to the hospital    The hospital cannot store your valuables, so please leave them at home    You will need to take off your jewelry prior to your procedure, we advise leaving your jewelry at home, as we cannot store your valuables    Please shower the morning of your procedure, or the night before    This is a same day procedure, in which you can expect to go home the same day. In any unforseen circumstanced this plan can change, but is unlikely.    Please make arrange for transportation home, as you will not be able to drive following your procedure  The Night Prior to Your Procedure:    Please do not have anything to eat or drink after Midnight (12:00am) prior to your procedure    It is important to stay well hydrated, and consume balanced meals the day prior to your procedure  Arriving for Your Procedure:    Please arrive at [facility], located at: [address] [place], [place] [id number]      parking is available after 6:00am for your convenience, parking is also available in the parking ramp located off of [address] attached to the hospital    If you park in the ramp  located on [address], take the elevator to level one. Enter the doors to the atrium/lobby area and check in at the main reception desk.     Please check in at the main reception desk in the lobby    You will be assisted to Cardiac [facility] on the third floor.  Going Home:    The physician and/or nurse will review any restrictions, follow-up requirements, and medication instructions prior to going home from the hospital in detail    Listed below are the restrictions that you can expect after your procedure:  o For 3 days after your procedure you will have the following restirictions:    No driving    Avoid hard work or tiring activities    No aggressive exercise    No yard work such as raking, mowing the lawn, shoveling snow or snowplowing    No jogging, biking, or sexual activity    No heaving lifting greater than 10 pounds  o You may shower the day after your procedure  o You may use a hot tub or swim 5 days after your procedure  Clinic Contact Information:    You can contact our main clinic line at any time with questions, concerns, or if you need further information: [facility] [phone]    If you have further questions in regards to the scheduling of you procedure, please contact [facility] Procedural Schedulers at [phone]    Medication prior to your procedure:      Please take your morning medications the day of your procedure with sips of water, except any multivitamins or supplements.    Continue taking your Coumadin as ordered, if your Coumadin dose needs to be adjusted I will contact you prior to your procedure. and You will need to have your INR checked 2-3 days prior to your procedure, to make sure your INR is suitable for your procedure. Please call your clinic that manages your INR to make this appointment        Sincerely,  Carmita Mcintyre RN  Please call with any questions or concerns regarding the procedure at : [phone]

## 2021-06-20 NOTE — PROGRESS NOTES
INR 1.8 After talking with pt and discussing history of greens/salads and medication change. Pt will  continue  with current diet and dosing of Warfarin.  Continue with moderation of Vit K and green leafy vegetables. Cautioned to call with increase bruising or bleeding. Reminded to call with medication change especially antibiotic. Call with any questions or concerns or any up coming procedures. Cautioned about using Herbal medication.

## 2021-06-20 NOTE — LETTER
Letter by Vaibhav Cary RN at      Author: Vaibhav Cary RN Service: -- Author Type: --    Filed:  Encounter Date: 3/19/2020 Status: (Other)         Tiffanie Sharma  [address] [address]      March 19, 2020      Dear Ms. Sharma,    RE: Remote Results    We are writing to you regarding your recent Remote Pacemaker check from home. Your transmission was received successfully. Battery status is satisfactory at this time.     Your results are within normal limits.    Your next device appointment will be a remote check on 6/22/2020; this will occur automatically.    To schedule or reschedule, please call [phone] and press 1.    NOTE: If you would like to do an extra transmission, please call [phone] and press 3 to speak to a nurse BEFORE transmitting. This ensures that the Device Clinic staff is aware of the reason you are sending a transmission, and can follow-up with you after it has been reviewed.    We will be checking your implanted device from home (remotely) every three months unless otherwise instructed. We will need to see you in the clinic at least once a year. You may need to be seen in the clinic sooner depending on the results of your check.    Please be aware:    The follow-up schedule is like a Physician prescription.    Your remote monitor is paired to your specific implanted device.      Sincerely,    [facility]

## 2021-06-21 NOTE — PROGRESS NOTES
INR 1.6 After talking with pt and discussing history of greens/salads and medication change. Pt will  continue  with current diet and dosing of Warfarin.  Continue with moderation of Vit K and green leafy vegetables. Cautioned to call with increase bruising or bleeding. Reminded to call with medication change especially antibiotic. Call with any questions or concerns or any up coming procedures. Cautioned about using Herbal medication.

## 2021-06-21 NOTE — PROGRESS NOTES
INR 1.6 missed 2 doses. Will continue at current dose. 4 mg Wed and sat and 2 mg all other days, retest in 2 weeks. After talking with pt and discussing history of greens/salads and medication change. Pt will  continue  with current diet and dosing of Warfarin.  Continue with moderation of Vit K and green leafy vegetables. Cautioned to call with increase bruising or bleeding. Reminded to call with medication change especially antibiotic. Call with any questions or concerns or any up coming procedures. Cautioned about using Herbal medication.

## 2021-06-21 NOTE — PROGRESS NOTES
Pt notified to increase metoprolol from 100mg to 150mg daily.   RX sent in for 50mg tabs, as Pt is unable to cut pills.     Follow up scheduled with Device and Joceline.   Pt verbalized understanding.   Sera Groves RN BSN PHN  Device Nurse   [facility]

## 2021-06-21 NOTE — PROGRESS NOTES
In clinic device check.  Please see link for full device report.  Patient was informed of results and next follow up during today's visit.      Type: In clinic annual pacemaker check.   Presenting Rhythm: Atrial fibrillation with Rapid ventricular response 90-160s.   Lead/Battery status: Stable, unable to test RV threshold due to elevated ventricular rates.   Arrhythmias: Persistent AF since 10/2017, burden 100%. V-rates >/=120bpm 15-20% of the time. Pt denies any awareness. 2 high ventricular rate detections from 8/28 and 8/2 showing AF with RVR with rates up to 200bpm.   Anticoagulant: Warfarin.   Comments: Normal device function. Reprogrammed mode from DDDR to VVIR due to persistent AF >1year. Routed to Joceline for ventricular response. PATRICE Groves RN BSN PHN  Device Nurse   [facility]

## 2021-06-22 NOTE — PATIENT INSTRUCTIONS - HE
INR 1.1 After talking with pt and discussing history of greens/salads and medication change. Pt will  continue  with current diet and dosing of Warfarin.  Continue with moderation of Vit K and green leafy vegetables. Cautioned to call with increase bruising or bleeding. Reminded to call with medication change especially antibiotic. Call with any questions or concerns or any up coming procedures. Cautioned about using Herbal medication.

## 2021-06-23 NOTE — PATIENT INSTRUCTIONS - HE
Tiffanie Sharma,    It was a pleasure to see you today at the [facility].     My recommendations after this visit include:    INR today = 5.0  No warfarin today.  Take 2 mg (1 tab) on Sun, Tues, Thurs, and Fri.  Take 4 mg (2 tabs) on Mon, Wed, Sat.  Recheck INR in 1 week.    Decrease metoprolol back to 100 mg daily  Start diltiazem 120 mg daily (heart rate control)    Stop simvastatin (interacts with dilitazem)  Start atorvastatin 40 mg daily     Remote pacemaker check in 2 weeks.    Follow up in clinic in 4-6 weeks.    Joceline Miner, [facility], Electrophysiology  [phone]  Lacey (nurse) [phone]

## 2021-06-23 NOTE — PATIENT INSTRUCTIONS - HE
INR 1.4 After talking with pt and discussing history of greens/salads and medication change. Pt will  continue  with current diet and dosing of Warfarin.  Continue with moderation of Vit K and green leafy vegetables. Cautioned to call with increase bruising or bleeding. Reminded to call with medication change especially antibiotic. Call with any questions or concerns or any up coming procedures. Cautioned about using Herbal medication.

## 2021-06-23 NOTE — PROGRESS NOTES
INR 1.4 pt states she forgot to take this week. Asked her to take at the same time she takes her simvastatin. She agreed and will retest in 2 weeks. After talking with pt and discussing history of greens/salads and medication change. Pt will  continue  with current diet and dosing of Warfarin.  Continue with moderation of Vit K and green leafy vegetables. Cautioned to call with increase bruising or bleeding. Reminded to call with medication change especially antibiotic. Call with any questions or concerns or any up coming procedures. Cautioned about using Herbal medication.

## 2021-06-24 NOTE — TELEPHONE ENCOUNTER
----- Message from Joceline Miner CNP sent at 3/8/2019 11:51 AM CST -----  Please call patient with normal lab results.  Continue medications as discussed yesterday in clinic.

## 2021-06-24 NOTE — TELEPHONE ENCOUNTER
Pt was to callback after checking meds at home against med list given with AVS  Pt is confused called numerous time to get set up for ablation   Ablation is not on the plan right now  Pt was called back to review again no response  I called left message for pt's son who she lives with, Dennis is his name  Dennis called back stating both he and his wife tried helping his mom with the list and she became very argumentative with them  I offered for Dennis's wife to call me to see if we can go over list with pt present  They have my direct number and I will await call   Dennis did mention his mom has declined,does not do much of anything or exercise or walk around,which her PMD has suggested  Joceline updated on kofi.

## 2021-06-24 NOTE — TELEPHONE ENCOUNTER
Pt was called per Joceline with results, pt seems confused was unable to find AVS and med list that was given  Ask pt to look for that and check her meds against list and call in  I asked pt if there was anyone that could help her and she said her son lives with her  But feels she should be able to do these things herself.  Will await her call

## 2021-06-24 NOTE — PROGRESS NOTES
PULMONARY OUTPATIENT FOLLOW UP NOTE     Assessment:   1. Severe COPD on home O2  FEV1 31% (2013)  Patient did not tolerate LABA/ICS/LAMA due to different reported size effects.  Doing well on albuterol HFA 4 times a day.    Continue O2 supplementation 2 LPM with activities and at night.   Increase physical activity.   2. GERD  Raise the head of the bed, avoid eating close to bed time. Continue PPI daily   3. HTN, CAD, SSS s/p pacemaker  4. Hx of atrial fibrillation, anticoagulated  5. Hx of breast cancer s/p R lumpectomy and radiation therapy      Plan:   1. O2 supplementation 2 LPM with activities and at night  2. Albuterol HFA as needed  3. Raise the head of the bed  4. Avoid eating close to bed time  5. Ranitidine at night as needed  6. Increase physical activity  7. Follow up in 9 months.      Moshe Ravi  Pulmonary / Critical Care  3/5/2019        CC:            Chief Complaint   Patient presents with     Follow Up      HPI:        Tiffanie Sharma is an 82 y.o. female with history of COPD on home O2, breast cancer dx 2009 s/p right lumpectomy and radiation therapy, HTN, CAD, a fib/ a flutter anticoagulated, SSS s/p pacemaker.   Pt is here for follow up appointment.   Doing well since last visit.   Able to walk close to a block before feeling short of breath, uses her O2 supplementation at night and with activities.  Denies orthopnea, no PND, no swelling of LE, no chest pain.   Pt repors developed hoarseness and arthralgias with symbicort, advair, flovent, dulera, breo. Tried spiriva and developed hoarseness, dry mouth and arthralgias.  Uses albuterol two puffs 4 times a day.   Denies acid reflux symptoms while taking pantoprazole 40 mg daily.   Weight loss quantified in 20 lbs since 2017.   Pt has history of tobacco use, 2 ppd for 50 + years, quit 8 years     PMH  1. Esophageal Reflux 530.81   2. Hyperlipidemia 272.4   3. Coronary Artery Disease 414.00   4. HTN  5. CHF diastolic  dysfunction  6. COPD on O2 supplementation 2 LPM at night and with activities   7. Atrial Flutter / Atrial Fibrillation        Current Outpatient Medications:      alendronate (FOSAMAX) 70 MG tablet, Take 70 mg by mouth every 7 days. , Disp: , Rfl:      aspirin 81 MG EC tablet, Take 81 mg by mouth daily., Disp: , Rfl:      atorvastatin (LIPITOR) 40 MG tablet, Take 1 tablet (40 mg total) by mouth at bedtime., Disp: 30 tablet, Rfl: 6     calcium-vitamin D (CALCIUM-VITAMIN D) 500 mg(1,250mg) -200 unit per tablet, Take 1 tablet by mouth 2 (two) times a day with meals., Disp: , Rfl:      cholecalciferol, vitamin D3, 1,000 unit tablet, Take 1,000 Units by mouth daily., Disp: , Rfl:      citalopram (CELEXA) 10 MG tablet, Take 10 mg by mouth daily. , Disp: , Rfl:      clobetasol (TEMOVATE) 0.05 % cream, Apply 1 application topically 2 (two) times a day as needed. , Disp: , Rfl:      digoxin (LANOXIN) 125 mcg tablet, TAKE ONE TABLET BY MOUTH DAILY, Disp: 90 tablet, Rfl: 0     gabapentin (NEURONTIN) 800 MG tablet, Take 800 mg by mouth 2 (two) times a day. , Disp: , Rfl: 1     GLUC INIGUEZ/CHONDRO INIGUEZ A/VIT C/MN (GLUCOSAMINE 1500 COMPLEX ORAL), Take 1,500 mg by mouth daily. , Disp: , Rfl:      metoprolol succinate (TOPROL-XL) 100 MG 24 hr tablet, Take 1 tablet (100 mg total) by mouth daily., Disp: 90 tablet, Rfl: 3     multivitamin with minerals tablet, Take 1 tablet by mouth daily., Disp: , Rfl:      PROAIR HFA 90 mcg/actuation inhaler, Inhale 2 puffs every 6 (six) hours as needed. , Disp: , Rfl:      traZODone (DESYREL) 50 MG tablet, Take 50 mg by mouth bedtime. , Disp: , Rfl:      triamcinolone (KENALOG) 0.5 % cream, Apply topically 3 (three) times a day., Disp: , Rfl:      warfarin (COUMADIN/JANTOVEN) 2 MG tablet, Take 2 mg tue, thur and Fri and 4 mg all other days., Disp: 60 tablet, Rfl: 0     diltiazem (CARDIZEM CD) 120 MG 24 hr capsule, Take 2 capsules (240 mg total) by mouth daily., Disp: 60 capsule, Rfl: 11     ranitidine  (ZANTAC) 150 MG tablet, Take 1 tablet (150 mg total) by mouth at bedtime., Disp: 30 tablet, Rfl: 12     Social History     Socioeconomic History     Marital status:      Spouse name: Not on file     Number of children: Not on file     Years of education: Not on file     Highest education level: Not on file   Occupational History     Not on file   Social Needs     Financial resource strain: Not on file     Food insecurity:     Worry: Not on file     Inability: Not on file     Transportation needs:     Medical: Not on file     Non-medical: Not on file   Tobacco Use     Smoking status: Former Smoker     Last attempt to quit: 2010     Years since quittin.3     Smokeless tobacco: Never Used   Substance and Sexual Activity     Alcohol use: No     Drug use: No     Sexual activity: Not on file   Lifestyle     Physical activity:     Days per week: Not on file     Minutes per session: Not on file     Stress: Not on file   Relationships     Social connections:     Talks on phone: Not on file     Gets together: Not on file     Attends [employer] witness service: Not on file     Active member of club or organization: Not on file     Attends meetings of clubs or organizations: Not on file     Relationship status: Not on file     Intimate partner violence:     Fear of current or ex partner: Not on file     Emotionally abused: Not on file     Physically abused: Not on file     Forced sexual activity: Not on file   Other Topics Concern     Not on file   Social History Narrative     Not on file     Family History   Problem Relation Age of Onset     No Medical Problems Mother       No Medical Problems Father       No Medical Problems Sister       No Medical Problems Daughter       No Medical Problems Maternal Grandmother       No Medical Problems Maternal Grandfather       No Medical Problems Paternal Grandmother       No Medical Problems Paternal Grandfather       No Medical Problems Maternal Aunt       No Medical Problems  "Paternal Aunt       BRCA 1/2 Neg Hx       Breast cancer Neg Hx       Cancer Neg Hx       Colon cancer Neg Hx       Endometrial cancer Neg Hx       Ovarian cancer Neg Hx        Review of Systems  A 12 point comprehensive review of systems was negative except as noted.      Objective:      Vitals:    03/05/19 1106   BP: 128/60   Pulse: (!) 129   SpO2: 98%   Weight: 128 lb (58.1 kg)   Height: 5' 1.25\" (1.556 m)    Patient oxygen saturation on RA at rest is 94%.   Oxygen saturation after ambulating 300ft on RA is 87%.    After ambulating 300ft on 2LPM oxygen saturation is 92%.    Gen: awake, alert, no distress  HEENT: pale conjunctiva, moist mucosa  Neck; no thyromegaly, masses or JVD  Lungs; decrease BS both hemithorax, prolonged expiration  CV: irregular, no murmurs or gallops appreciated  Abd: soft, distended, BS wnl  Ext: no edema      Diagnostic tests:      PFTs (12/11/2013)   FVC 1.58 L (59%) FEV1 0.62 l (31 %) FEV1/FVC 39% Significant postbronchodilator response FVC 1.85 L (increase in 16%)   TLC 7.31 L (149%) RV 5.40 l (239%)   DLCO 44% and DLCO/VA 66%      Echocardiogram (5/13/2014)   Normal LV size and function, EF 79%, no regional wall motion abnormality, diastolic dysfunction grade 3, mild LVHT, mild MS, RV systolic function is mildly reduced.     Chest CT scan (11/16/13)   LUNGS AND PLEURA: Lungs are hyperinflated WITH emphysematous change. Left-sided pneumothorax is completely resolved. Small bilateral pleural effusions with mild bibasilar atelectasis posteriorly. Benign stable partially calcified chronic consolidation and cicatricial volume loss in the posterior segment right upper lobe, apical posterior segment left upper lobe and superior segments of both lower lobes. MEDIASTINUM: Dense coronary artery calcification. Mild enlargement central pulmonary arteries is associated with some degree of pulmonary arterial hypertension. Benign circumscribed hypodense focus left thyroid lobe has decreased in size " from 2010 therefore benign. Small lymph nodes in the anterior mediastinal fat adjacent to the inferior heart have developed (image 74 of series 4). LIMITED UPPER ABDOMEN: Heavy calcified atheromatous plaque at the origin of the renal and mesenteric arteries. MUSCULOSKELETAL: Bones are demineralized.   CONCLUSION: 1. No pneumothorax. 2. Emphysema and chronic partially calcified cicatricial volume loss in the lungs unchanged. 3. Dense three-vessel coronary artery calcification. 4. Several small inferior anterior mediastinal lymph nodes have developed since 2010    CT ABDOMEN PELVIS WO ORAL W IV CONTRAST, CT CHEST W CONTRAST 12/3/2016 8:14 PM  INDICATION: Fall with left flank pain.  COMPARISON: Chest CT from 10/15/2016.  CHEST: Areas of volume loss, scarring, and calcification are again seen in the upper lobes of both lungs, unchanged from the prior exam. There is moderate centrilobular emphysema. The pleural spaces appear normal..  MEDIASTINUM: There is a small sliding hiatal hernia. A low dense lesion is again seen in the left lobe of the thyroid gland likely representing a benign colloid cyst. Left subclavian dual-lead pacemaker. Calcified atherosclerotic plaque in the thoracic   aorta and its major branches including coronary arteries. No lymphadenopathy.  ABDOMEN: Cholecystectomy. 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 The liver, spleen, adrenal glands, and pancreas appear normal. The kidneys appear mildly atrophic with multiple small low   dense lesions likely representing benign cysts. The abdominal aorta is normal in caliber with severe calcified atherosclerotic plaque and plaque at the origins of its major branches. Enlarged lymph nodes are seen in the gastrohepatic ligament measuring   up to 6 x 12 mm. Retroperitoneal lymph nodes are in the upper range of normal in size. Mesenteric lymph nodes are mildly prominent in the upper range of normal.  Diverticula are seen in the colon without evidence of diverticulitis.  PELVIS: There is no evidence of free air, free fluid, or fluid collection. Extensive colonic diverticulosis without evidence of diverticulitis. Densely calcified atherosclerotic plaque in the pelvic arteries.  MUSCULOSKELETAL: Degenerative changes at L4-L5. No fractures are identified.  CONCLUSION:  1.  Extensive colonic diverticulosis without evidence of diverticulitis.  2.  Areas of fibrosis, scarring, and calcification in both lungs, unchanged.  3.  Moderate centrilobular emphysema.  4.  Benign-appearing thyroid nodule unchanged.  5.  Densely calcified atherosclerotic plaque in the visualized arteries.  6.  Extensive colonic diverticulosis without evidence of diverticulitis.    XR CHEST PA AND LATERAL 5/31/2017 9:01 PM  INDICATION: SOB  COMPARISON: October 17, 2016, October 15, 2016 and chest CT December 3, 2016  FINDINGS: There is a dual-chamber pacemaker the heart and pulmonary vasculature are normal. An oval density at the left lung base represents a Bochdalek hernia as seen on the prior CT, this is a benign finding. There is some right suprahilar infiltrate   or opacity and some focal opacity at the left apex but these are similar to the prior chest x-rays and correspond to focal and studies seen on the CT. An acute infiltrate is not seen

## 2021-06-24 NOTE — PATIENT INSTRUCTIONS - HE
Tiffanie Sharma,    It was a pleasure to see you today at the [facility].     My recommendations after this visit include:    Increase diltiazem to 240 mg (2 capsules) daily.  Take at night.    INR today 2.4  Continue warfarin 2 mg on Tues and Friday with 4 mg the other 5 of 7 days.  Recheck INR in 3 weeks.    When you get home, compare medication list to what you are taking.  Call if they are different.    Remote pacemaker check in 2 weeks.    Joceline Miner, [facility], Electrophysiology  [phone]  [place] (nurse) [phone]

## 2021-06-24 NOTE — PROGRESS NOTES
INR 1.5 Continue current management dosing of Warfarin. Continue  diet of moderate Vitamin K intake. Discussed with pt the need to call with questions or concerns or any change in medication especially herbal medication or OTC. Call with increased bleeding or bruising or any upcoming procedures.  Increase dose to 2 mg Tue and Fri and 4 mg all other days.

## 2021-06-24 NOTE — PATIENT INSTRUCTIONS - HE
1. O2 supplementation 2 LPM with activities and at night  2. Albuterol HFA as needed  3. Raise the head of the bed  4. Avoid eating close to bed time  5. Ranitidine at night as needed  6. Increase physical activity  7. Follow up in 9 months.

## 2021-06-27 NOTE — PROGRESS NOTES
Progress Notes by Joceline Miner CNP at 1/24/2019  3:30 PM     Author: Joceline Miner CNP Service: -- Author Type: Nurse Practitioner    Filed: 1/24/2019  4:34 PM Encounter Date: 1/24/2019 Status: Signed    : Joceline Miner CNP (Nurse Practitioner)           Click to link to [facility]     [facility] ELECTROPHYSIOLOGY NOTE      Assessment/Recommendations   Assessment/Plan:    1.  Permanent atrial fibrillation and flutter: Ventricular response continues to be somewhat elevated.  She has been mildly hypotensive since her metoprolol was increased.  Decrease metoprolol succinate back to 100 mg daily.  Continue digoxin 125 mcg daily.  Start diltiazem 120 mg daily and uptitrate as needed.  We will check heart rates with a remote pacemaker interrogation in 2 weeks.    She has a [id number] score of 4 for age >75, female gender, and coronary artery disease; she is on warfarin for stroke prophylaxis.  INRs have been subtherapeutic, but today was 5.0.  She does not recall any change in diet or medication and believes she has been taking her warfarin as instructed.  Decrease warfarin to 4 mg on Monday, Wednesday, and Saturday with 2 mg ROW.  Recheck INR in 1 week.    2.  Sick sinus syndrome status post dual-chamber pacemaker implant:  The pacemaker was interrogated and is functioning appropriately.  The battery shows estimated longevity of 8.9 years.  Ventricular lead sensing, impedance, and pacing thresholds is stable and within normal limits.      3.  Coronary artery disease: Denies anginal symptoms.  Discontinue simvastatin due to interaction with diltiazem.  Start atorvastatin 40 mg daily.    Follow up in 4-6 weeks.     History of Present Illness    Ms. Tiffanie Sharma is a very pleasant 83 y.o. female who comes in today for EP device and arrhythmia follow-up.  She has a long-standing history of atrial fibrillation.  She failed antiarrhythmic therapy with dofetilide and sotalol.  She had  a symptomatic recurrence of a persistent A. fib and was switched to a rate control strategy.  She has been long-standing persistent in atrial fibrillation or flutter since February 2015.  She has had some ongoing issues with rapid ventricular response for which her metoprolol succinate was increased 150 mg daily in October.  She is also on digoxin 125 mcg daily.  She also has a history of tachybradycardia syndrome for which she underwent dual-chamber pacemaker implant on 11/25/2013, GERD, hyperlipidemia, breast cancer status post lumpectomy and radiation, spontaneous pneumothorax, COPD, and coronary artery disease with heavy calcifications noted on CT.  She has been on supplemental oxygen since 2018.    Tiffanie states that she is not feeling very well today.  She became very tired and short of breath walking to the exam room in clinic.  It was noted that her portable oxygen carrier was empty she was provided oxygen through a portable tank during her clinic visit.  She denies chest discomfort, palpitations, abdominal fullness/bloating or peripheral edema, shortness of breath at rest, paroxysmal nocturnal dyspnea, orthopnea, lightheadedness, dizziness, pre-syncope, or syncope.    Cardiographics (personally reviewed):  Pacemaker Interrogation ([facility]):  Pacing mode: VVIR   Presenting rhythm: AF-VS 70-110s  Underlying rhythm: AF VR 70-110s  V-paced 5%.  Battery: estimated longevity 8.9 years.  Ventricular lead: Stable with good sensing, impedance, and pacing threshold  Arrhythmias: Permanent AF.  2 NSVT, EGM shows AF-RVR  Histograms:  tendency for RVR, VR >120 bpm 15-20%       Physical Examination Review of Systems   Vitals:    01/24/19 1504   BP: 92/60   Pulse: 68   Resp: 20     Body mass index is 24.74 kg/m .  Wt Readings from Last 3 Encounters:   01/24/19 132 lb (59.9 kg)   10/12/18 139 lb (63 kg)   01/04/18 147 lb (66.7 kg)     General Appearance:   Alert, cooperative and in no acute distress.   HEENT:  Atraumatic, normocephalic.  No scleral icterus, normal conjunctivae, EOM intact; mucous membranes pink and moist.     Chest: Chest symmetric, spine straight.   Lungs:   Respirations unlabored: Lungs are diminished but clear to auscultation.   Cardiovascular:   Normal first and second heart sounds with no murmurs, rubs, or gallops.  Irregularly irregular rate and rhythm.  Radial and posterior tibial pulses are intact, no edema.   Abdomen:  Soft, nontender, nondistended, bowel sounds present   Extremities: No cyanosis or clubbing   Musculoskeletal: Moves all extremities   Skin: Warm, dry, intact.  Skin on legs dry and flaky.   Neurologic: Mood and affect are appropriate, alert and oriented to person, place, time, and situation    General: WNL  Eyes: WNL  Ears/Nose/Throat: WNL  Lungs: WNL  Heart: WNL  Stomach: WNL  Bladder: WNL  Muscle/Joints: WNL  Skin: WNL  Nervous System: WNL  Mental Health: WNL     Blood: WNL     Medical History  Surgical History Family History Social History   Past Medical History:   Diagnosis Date   ? Atrial fibrillation (H)    ? Atrial flutter (H)     Created by Conversion    ? Breast cancer (H) 2009   ? Breast cyst 1980    at drs office benign   ? COPD (chronic obstructive pulmonary disease) (H) 5/7/2015   ? Coronary Artery Disease     Created by Conversion  Replacement Utility updated for latest IMO load   ? Esophageal reflux     Created by Conversion    ? Hx of radiation therapy    ? Hyperlipidemia     Created by Conversion    ? Pneumothorax    ? Sick Sinus Syndrome     Created by Conversion     Past Surgical History:   Procedure Laterality Date   ? BREAST BIOPSY Right 2009    cancer   ? BREAST CYST ASPIRATION     ? BREAST CYST EXCISION     ? ID EXCISE BREAST CYST      Description: Breast Surgery Lumpectomy;  Recorded: 12/15/2013;    Family History   Problem Relation Age of Onset   ? No Medical Problems Mother    ? No Medical Problems Father    ? No Medical Problems Sister    ? No Medical  Problems Daughter    ? No Medical Problems Maternal Grandmother    ? No Medical Problems Maternal Grandfather    ? No Medical Problems Paternal Grandmother    ? No Medical Problems Paternal Grandfather    ? No Medical Problems Maternal Aunt    ? No Medical Problems Paternal Aunt    ? BRCA 1/2 Neg Hx    ? Breast cancer Neg Hx    ? Cancer Neg Hx    ? Colon cancer Neg Hx    ? Endometrial cancer Neg Hx    ? Ovarian cancer Neg Hx     Social History     Socioeconomic History   ? Marital status:      Spouse name: Not on file   ? Number of children: Not on file   ? Years of education: Not on file   ? Highest education level: Not on file   Social Needs   ? Financial resource strain: Not on file   ? Food insecurity - worry: Not on file   ? Food insecurity - inability: Not on file   ? Transportation needs - medical: Not on file   ? Transportation needs - non-medical: Not on file   Occupational History   ? Not on file   Tobacco Use   ? Smoking status: Former Smoker     Last attempt to quit: 2010     Years since quittin.2   ? Smokeless tobacco: Never Used   Substance and Sexual Activity   ? Alcohol use: No   ? Drug use: No   ? Sexual activity: Not on file   Other Topics Concern   ? Not on file   Social History Narrative   ? Not on file          Medications  Allergies   Current Outpatient Medications   Medication Sig Dispense Refill   ? alendronate (FOSAMAX) 70 MG tablet Take 70 mg by mouth every 7 days.      ? aspirin 81 MG EC tablet Take 81 mg by mouth daily.     ? calcium-vitamin D (CALCIUM-VITAMIN D) 500 mg(1,250mg) -200 unit per tablet Take 1 tablet by mouth 2 (two) times a day with meals.     ? cholecalciferol, vitamin D3, 1,000 unit tablet Take 1,000 Units by mouth daily.     ? citalopram (CELEXA) 10 MG tablet Take 10 mg by mouth daily.      ? clobetasol (TEMOVATE) 0.05 % cream Apply 1 application topically 2 (two) times a day as needed.      ? digoxin (LANOXIN) 125 mcg tablet TAKE ONE TABLET BY MOUTH DAILY  90 tablet 0   ? gabapentin (NEURONTIN) 800 MG tablet Take 800 mg by mouth 2 (two) times a day.   1   ? GLUC INIGUEZ/CHONDRO INIGUEZ A/VIT C/MN (GLUCOSAMINE 1500 COMPLEX ORAL) Take 1,500 mg by mouth daily.      ? metoprolol succinate (TOPROL-XL) 100 MG 24 hr tablet Take 1 tablet (100 mg total) by mouth daily. 90 tablet 3   ? multivitamin with minerals tablet Take 1 tablet by mouth daily.     ? PROAIR HFA 90 mcg/actuation inhaler Inhale 2 puffs every 6 (six) hours as needed.      ? traZODone (DESYREL) 50 MG tablet Take 50 mg by mouth bedtime.      ? triamcinolone (KENALOG) 0.5 % cream Apply topically 3 (three) times a day.     ? warfarin (COUMADIN/JANTOVEN) 2 MG tablet Take 2 mg tue, thur and Fri and 4 mg all other days. 60 tablet 0   ? atorvastatin (LIPITOR) 40 MG tablet Take 1 tablet (40 mg total) by mouth at bedtime. 30 tablet 6   ? diltiazem (CARDIZEM CD) 120 MG 24 hr capsule Take 1 capsule (120 mg total) by mouth daily. 30 capsule 11     No current facility-administered medications for this visit.       Allergies   Allergen Reactions   ? Acetaminophen-Codeine    ? Advair Diskus [Fluticasone-Salmeterol] Other (See Comments)     Pt c/o muscle and bone paing.  Cramping, hoarseness, and dry mouth.   ? Flovent [Fluticasone] Other (See Comments)     Pt c/o muscle and bone pain.  Cramping, hoarseness, and dry mouth.   ? Ibuprofen    ? Nsaids (Non-Steroidal Anti-Inflammatory Drug)    ? Symbicort [Budesonide-Formoterol] Other (See Comments)     Pt c/o muscle and bone pain.  Cramping, hoarseness, and dry mouth      Medical, surgical, family, social history, and medications were all reviewed and updated as necessary.   Lab Results    Chemistry CBC Other   Lab Results   Component Value Date    CREATININE 0.87 05/31/2017    BUN 19 05/31/2017     05/31/2017    K 4.5 05/31/2017     (H) 05/31/2017    CO2 27 05/31/2017     Creatinine (mg/dL)   Date Value   05/31/2017 0.87   12/05/2016 0.76   12/04/2016 0.72   12/03/2016 0.72     Lab Results   Component Value Date    WBC 6.0 05/31/2017    HGB 13.5 05/31/2017    HCT 43.4 05/31/2017    MCV 92 05/31/2017     05/31/2017    Lab Results   Component Value Date    INR 5.00 (!) 01/24/2019    INR 1.4 01/16/2019    INR 1.1 01/02/2019     Lab Results   Component Value Date    TSH 1.42 05/31/2017            30 minutes were spent face to face in this visit with more than 50% spent discussing diagnoses as listed above, counseling, and coordination of care.    This note has been dictated using voice recognition software. Any grammatical, typographical, or context distortions are unintentional and inherent to the software.    Joceline Miner, [facility]   Electrophysiology

## 2021-06-27 NOTE — PROGRESS NOTES
Progress Notes by Joceline Miner CNP at 7/25/2019 10:30 AM     Author: Joceline Miner CNP Service: -- Author Type: Nurse Practitioner    Filed: 7/25/2019 12:00 PM Encounter Date: 7/25/2019 Status: Signed    : Joceline Miner CNP (Nurse Practitioner)           Click to link to [facility]     [facility] HEART [facility] ELECTROPHYSIOLOGY NOTE      Assessment/Recommendations   Assessment/Plan:    1.  Permanent atrial fibrillation and flutter: Ventricular response continues to be elevated, though it is not clear whether or not she has been taking her medications.  She seems off again today, is having trouble remembering things, and had a sudden outburst of both laughing and crying.  With her permission, I had her son Dennis join us for a discussion of treatment options.  Due to my significant concerns regarding her ability to accurately take medications, particularly high risk medications such as digoxin, and her ongoing rapid ventricular response, recommend AV node ablation.  This would allow us to discontinue at least diltiazem and digoxin.  We discussed the procedure, risks, and expected recovery.  They both verbalized agreement and state their questions have been answered to their satisfaction and wishes to proceed.  For now, continue metoprolol succinate 100 mg daily, digoxin 125 mcg daily and diltiazem to 240 mg daily.  We also discussed that she has agreed to allow her son and daughter-in-law to assist her with her medications.  She verbalized this in front of Dennis as well.    She has a [id number] score of 4 for age >75, female gender, and coronary artery disease; continue warfarin for stroke prophylaxis, managed through her primary clinic.    2.  Sick sinus syndrome status post dual-chamber pacemaker implant:  The pacemaker was interrogated and is functioning appropriately.  The battery shows estimated longevity of 7.8 years.  Ventricular lead sensing, impedance, and pacing thresholds is stable and  "within normal limits.      3.  Coronary artery disease: Denies anginal symptoms.  Continue statin.    Follow-up in 6 weeks.     History of Present Illness    Ms. Tiffanie Sharma is a very pleasant 83 y.o. female who comes in today for EP device and arrhythmia follow-up.  She has a long-standing history of atrial fibrillation.  She failed antiarrhythmic therapy with dofetilide and sotalol.  She had asymptomatic recurrence of persistent A. fib and was switched to a rate control strategy.  She has been long-standing persistent in atrial fibrillation or flutter since February 2015, now considered permanent.  She also has a history of tachybradycardia syndrome s/p dual-chamber pacemaker implant on 11/25/2013, GERD, hyperlipidemia, breast cancer status post lumpectomy and radiation, spontaneous pneumothorax, COPD, and coronary artery disease with heavy calcifications noted on CT.  She has been on supplemental oxygen since 2018.  She has had continued to have issues with rapid ventricular response despite up titration of medications, currently at metoprolol succinate 100 mg daily and digoxin 125 mcg daily and diltiazem 240 mg daily.  There is also significant concern with whether or not she is taking her medications and/or taking them correctly.  She has repeatedly refused assistance from her son and daughter-in-law.    Tiffanie states that she feels ok, but then bursts out crying followed by laughter.  She expressed confusion and frustration with her medication management.  She does not know her medications today and cannot remember if or what she has been taking.  He did state that Dr. Felix had started her on \"something to make her pee more\" that she had not yet picked up from the pharmacy.  She denies chest discomfort, palpitations, abdominal fullness/bloating or peripheral edema, shortness of breath at rest, paroxysmal nocturnal dyspnea, orthopnea, lightheadedness, dizziness, pre-syncope, or syncope.  She is " wearing her oxygen at home, but not when she travels out of the house.  She continues to have dyspnea on exertion and states that her lower extremity edema has worsened.    Cardiographics (personally reviewed):  Pacemaker Interrogation ([facility]):  Pacing mode: VVIR   Presenting rhythm: AF-VS 80s-140s  Underlying rhythm: AF RVR 80s-140s  V-paced 17%.  Battery: estimated longevity 7.8 years.  Ventricular lead: Stable with good sensing, impedance, and pacing threshold  Arrhythmias: Permanent AF.  0 NSVT  Histograms:  tendency for RVR, VR >120 bpm 25-30% despite being sedentary       Physical Examination Review of Systems   Vitals:    07/25/19 1015   BP: 110/58   Pulse: 72   Resp: 20   SpO2: 93%     Body mass index is 23.61 kg/m .  Wt Readings from Last 3 Encounters:   07/25/19 126 lb (57.2 kg)   03/07/19 128 lb (58.1 kg)   03/05/19 128 lb (58.1 kg)     General Appearance:   Alert, cooperative and in no acute distress.   HEENT: Atraumatic, normocephalic.  No scleral icterus, normal conjunctivae, EOM intact; mucous membranes pink and moist.     Chest: Chest symmetric, spine straight.  Left subclavian pacemaker site with no sign of infection or tissue thinning.   Lungs:   Respirations unlabored: Lungs are diminished but clear to auscultation.     Cardiovascular:   Normal first and second heart sounds with no murmurs, rubs, or gallops.  Irregularly irregular rate and rhythm.  Radial and posterior tibial pulses are intact, 2+ bilateral ankle edema.   Abdomen:  Soft, nontender, nondistended, bowel sounds present   Extremities: No cyanosis or clubbing   Musculoskeletal: Moves all extremities   Skin: Warm, dry, intact.     Neurologic: Sudden onset of crying and laughter without obvious trigger, alert and oriented to person, place, time, and situation    General: WNL  Eyes: WNL  Ears/Nose/Throat: WNL  Lungs: WNL  Heart: Leg Swelling  Stomach: WNL  Bladder: WNL  Muscle/Joints: WNL  Skin: WNL  Nervous System: WNL  Mental  Health: WNL     Blood: WNL     Medical History  Surgical History Family History Social History   Past Medical History:   Diagnosis Date   ? Atrial fibrillation (H)    ? Atrial flutter (H)     Created by Conversion    ? Breast cancer (H) 2009   ? Breast cyst 1980    at drs office benign   ? COPD (chronic obstructive pulmonary disease) (H) 5/7/2015   ? Coronary Artery Disease     Created by Conversion  Replacement Utility updated for latest IMO load   ? Esophageal reflux     Created by Conversion    ? Hx of radiation therapy    ? Hyperlipidemia     Created by Conversion    ? Pneumothorax    ? Sick Sinus Syndrome     Created by Conversion     Past Surgical History:   Procedure Laterality Date   ? BREAST BIOPSY Right 2009    cancer   ? BREAST CYST ASPIRATION     ? BREAST CYST EXCISION     ? [place] EXCISE BREAST CYST      Description: Breast Surgery Lumpectomy;  Recorded: 12/15/2013;    Family History   Problem Relation Age of Onset   ? No Medical Problems Mother    ? No Medical Problems Father    ? No Medical Problems Sister    ? No Medical Problems Daughter    ? No Medical Problems Maternal Grandmother    ? No Medical Problems Maternal Grandfather    ? No Medical Problems Paternal Grandmother    ? No Medical Problems Paternal Grandfather    ? No Medical Problems Maternal Aunt    ? No Medical Problems Paternal Aunt    ? BRCA 1/2 Neg Hx    ? Breast cancer Neg Hx    ? Cancer Neg Hx    ? Colon cancer Neg Hx    ? Endometrial cancer Neg Hx    ? Ovarian cancer Neg Hx     Social History     Socioeconomic History   ? Marital status:      Spouse name: Not on file   ? Number of children: Not on file   ? Years of education: Not on file   ? Highest education level: Not on file   Occupational History   ? Not on file   Social Needs   ? Financial resource strain: Not on file   ? Food insecurity:     Worry: Not on file     Inability: Not on file   ? Transportation needs:     Medical: Not on file     Non-medical: Not on file    Tobacco Use   ? Smoking status: Former Smoker     Last attempt to quit: 2010     Years since quittin.7   ? Smokeless tobacco: Never Used   Substance and Sexual Activity   ? Alcohol use: No   ? Drug use: No   ? Sexual activity: Not on file   Lifestyle   ? Physical activity:     Days per week: Not on file     Minutes per session: Not on file   ? Stress: Not on file   Relationships   ? Social connections:     Talks on phone: Not on file     Gets together: Not on file     Attends [employer] service: Not on file     Active member of club or organization: Not on file     Attends meetings of clubs or organizations: Not on file     Relationship status: Not on file   ? Intimate partner violence:     Fear of current or ex partner: Not on file     Emotionally abused: Not on file     Physically abused: Not on file     Forced sexual activity: Not on file   Other Topics Concern   ? Not on file   Social History Narrative   ? Not on file          Medications  Allergies   Current Outpatient Medications   Medication Sig Dispense Refill   ? alendronate (FOSAMAX) 70 MG tablet Take 70 mg by mouth every 7 days.      ? aspirin 81 MG EC tablet Take 81 mg by mouth daily.     ? atorvastatin (LIPITOR) 40 MG tablet Take 1 tablet (40 mg total) by mouth at bedtime. 30 tablet 6   ? calcium-vitamin D (CALCIUM-VITAMIN D) 500 mg(1,250mg) -200 unit per tablet Take 1 tablet by mouth 2 (two) times a day with meals.     ? cholecalciferol, vitamin D3, 1,000 unit tablet Take 1,000 Units by mouth daily.     ? citalopram (CELEXA) 10 MG tablet Take 10 mg by mouth daily.      ? clobetasol (TEMOVATE) 0.05 % cream Apply 1 application topically 2 (two) times a day as needed.      ? digoxin (LANOXIN) 125 mcg tablet TAKE ONE TABLET BY MOUTH DAILY 90 tablet 0   ? diltiazem (CARDIZEM CD) 120 MG 24 hr capsule Take 2 capsules (240 mg total) by mouth daily. 60 capsule 11   ? gabapentin (NEURONTIN) 800 MG tablet Take 800 mg by mouth 2 (two) times a day.   1    ? GLUC INIGUEZ/CHONDRO INIGUEZ A/VIT C/MN (GLUCOSAMINE 1500 COMPLEX ORAL) Take 1,500 mg by mouth daily.      ? metoprolol succinate (TOPROL-XL) 100 MG 24 hr tablet Take 1 tablet (100 mg total) by mouth daily. 90 tablet 3   ? multivitamin with minerals tablet Take 1 tablet by mouth daily.     ? PROAIR HFA 90 mcg/actuation inhaler Inhale 2 puffs every 6 (six) hours as needed.      ? ranitidine (ZANTAC) 150 MG tablet Take 1 tablet (150 mg total) by mouth at bedtime. 30 tablet 12   ? traZODone (DESYREL) 50 MG tablet Take 50 mg by mouth bedtime.      ? triamcinolone (KENALOG) 0.5 % cream Apply topically 3 (three) times a day.     ? warfarin (COUMADIN/JANTOVEN) 2 MG tablet Take 2 mg tue, thur and Fri and 4 mg all other days. 60 tablet 0     No current facility-administered medications for this visit.       Allergies   Allergen Reactions   ? Acetaminophen-Codeine    ? Advair Diskus [Fluticasone Propion-Salmeterol] Other (See Comments)     Pt c/o muscle and bone paing.  Cramping, hoarseness, and dry mouth.   ? Flovent [Fluticasone] Other (See Comments)     Pt c/o muscle and bone pain.  Cramping, hoarseness, and dry mouth.   ? Ibuprofen    ? Nsaids (Non-Steroidal Anti-Inflammatory Drug)    ? Symbicort [Budesonide-Formoterol] Other (See Comments)     Pt c/o muscle and bone pain.  Cramping, hoarseness, and dry mouth      Medical, surgical, family, social history, and medications were all reviewed and updated as necessary.   Lab Results    Chemistry CBC Other   Lab Results   Component Value Date    CREATININE 0.79 07/24/2019    BUN 14 07/24/2019     07/24/2019    K 4.7 07/24/2019     07/24/2019    CO2 27 07/24/2019     Creatinine (mg/dL)   Date Value   07/24/2019 0.79   03/07/2019 0.80   05/31/2017 0.87   12/05/2016 0.76    Lab Results   Component Value Date    WBC 6.0 05/31/2017    HGB 13.5 05/31/2017    HCT 43.4 05/31/2017    MCV 92 05/31/2017     05/31/2017    Lab Results   Component Value Date    INR 1.8  05/15/2019    INR 1.7 04/25/2019    INR 5.5 04/04/2019     Lab Results   Component Value Date    TSH 1.01 07/24/2019              This note has been dictated using voice recognition software. Any grammatical, typographical, or context distortions are unintentional and inherent to the software.    Joceline Miner, [facility]   Electrophysiology

## 2021-06-28 NOTE — PROGRESS NOTES
Progress Notes by Joceline Miner CNP at 9/18/2019 10:30 AM     Author: Joceline Miner CNP Service: -- Author Type: Nurse Practitioner    Filed: 9/18/2019 10:50 AM Encounter Date: 9/18/2019 Status: Signed    : Joceline Miner CNP (Nurse Practitioner)           Click to link to [facility]     [facility] HEART [facility] ELECTROPHYSIOLOGY NOTE      Assessment/Recommendations   Assessment/Plan:    1.  Permanent atrial fibrillation and flutter: She appears much better since prior to her AV node ablation.  She has noticeably less short of breath and more energetic.  Her mentation appears to have cleared some.  Decrease metoprolol succinate to 50 mg daily.    She has a [id number] score of 4 for age >75, female gender, and coronary artery disease.  INR in 8/28/2019 was only 1.2.  INR today is 3.8.  Decrease warfarin to 6 mg on Fridays with 4 mg ROW.  Recheck INR at her primary clinic in 1 week.    2.  Sick sinus syndrome status post dual-chamber pacemaker implant, AV node ablation:  The pacemaker was interrogated and is functioning appropriately.  The battery shows estimated longevity of 6.6-6.9 years.  Ventricular lead sensing, impedance, and pacing thresholds is stable and within normal limits.  Lower pacing rate decreased to 70 bpm.    3.  Coronary artery disease: Denies anginal symptoms.  Continue statin.    Follow-up in 3 months.     History of Present Illness    Ms. Tiffanie Sharma is a very pleasant 84 y.o. female who comes in today for EP device and arrhythmia follow-up.  She has a long-standing history of atrial fibrillation.  She failed antiarrhythmic therapy with dofetilide and sotalol.  She had asymptomatic recurrence of persistent A. fib and was switched to a rate control strategy.  She has been long-standing persistent in atrial fibrillation or flutter since February 2015, now considered permanent.  Unable to adequately control ventricular rates with medications, as well as significant  concern regarding medication compliance and safety.  Therefore, she underwent AV node ablation on 8/16/2019 at which time her digoxin and diltiazem were discontinued.  She also has a history of tachybradycardia syndrome s/p dual-chamber pacemaker implant on 11/25/2013, GERD, hyperlipidemia, breast cancer status post lumpectomy and radiation, spontaneous pneumothorax, COPD, and coronary artery disease with heavy calcifications noted on CT.  She has been on supplemental oxygen since 2018.      Tiffanie states that she is feeling better since her ablation.   She denies chest discomfort, palpitations, abdominal fullness/bloating, shortness of breath at rest, paroxysmal nocturnal dyspnea, orthopnea, lightheadedness, dizziness, pre-syncope, or syncope.  She is wearing her oxygen at home, but not when she travels out of the house.  She states that she has less dyspnea on exertion and lower extremity edema.    Cardiographics (personally reviewed):  Pacemaker Interrogation ([facility]):  Pacing mode: VVIR .  Lower rate decreased to 70 bpm  Presenting rhythm: AF- at 80, PVCs  Underlying rhythm: AF, No Rs at VVI 30  V-paced 90%.  Battery: estimated longevity 6.6-6.9 years.  Ventricular lead: Stable with good sensing, impedance, and pacing threshold  Arrhythmias: Permanent AF.  0 NSVT  Histograms: Rate distribution        Physical Examination Review of Systems   Vitals:    09/18/19 0938   BP: 114/64   Pulse: 76   Resp: 20     Body mass index is 22.32 kg/m .  Wt Readings from Last 3 Encounters:   09/18/19 126 lb (57.2 kg)   08/16/19 128 lb 8 oz (58.3 kg)   07/25/19 126 lb (57.2 kg)     General Appearance:   Alert, cooperative and in no acute distress.   HEENT: Atraumatic, normocephalic.  No scleral icterus, normal conjunctivae, EOM intact; mucous membranes pink and moist.     Chest: Chest symmetric, spine straight.  Left subclavian pacemaker site with no sign of infection or tissue thinning.   Lungs:   Respirations  unlabored: Lungs are diminished but clear to auscultation.     Cardiovascular:   Normal first and second heart sounds with no murmurs, rubs, or gallops.  Irregularly irregular rate and rhythm.  Radial and posterior tibial pulses are intact, trace-1+ bilateral ankle edema.   Abdomen:  Soft, nontender, nondistended, bowel sounds present   Extremities: No cyanosis or clubbing   Musculoskeletal: Moves all extremities   Skin: Warm, dry, intact.     Neurologic: Sudden onset of crying and laughter without obvious trigger, alert and oriented to person, place, time, and situation    General: WNL  Eyes: WNL  Ears/Nose/Throat: WNL  Lungs: WNL  Heart: WNL  Stomach: WNL  Bladder: WNL  Muscle/Joints: WNL  Skin: WNL  Nervous System: WNL  Mental Health: WNL     Blood: WNL     Medical History  Surgical History Family History Social History   Past Medical History:   Diagnosis Date   ? Atrial fibrillation (H)    ? Atrial flutter (H)     Created by Conversion    ? Breast cancer (H) 2009   ? Breast cyst 1980    at drs office benign   ? COPD (chronic obstructive pulmonary disease) (H) 5/7/2015   ? Coronary Artery Disease     Created by Conversion  Replacement Utility updated for latest IMO load   ? Esophageal reflux     Created by Conversion    ? Hx of radiation therapy    ? Hyperlipidemia     Created by Conversion    ? Pneumothorax    ? Sick Sinus Syndrome     Created by Conversion     Past Surgical History:   Procedure Laterality Date   ? BREAST BIOPSY Right 2009    cancer   ? BREAST CYST ASPIRATION     ? BREAST CYST EXCISION     ? EP ABLATION AV NODE N/A 8/16/2019    Procedure: EP Ablation AV Node;  Surgeon: Abel Zapien MD;  Location: [facility];  Service: Cardiology   ? [place] EXCISE BREAST CYST      Description: Breast Surgery Lumpectomy;  Recorded: 12/15/2013;    Family History   Problem Relation Age of Onset   ? No Medical Problems Mother    ? No Medical Problems Father    ? No Medical Problems Sister    ? No Medical  Problems Daughter    ? No Medical Problems Maternal Grandmother    ? No Medical Problems Maternal Grandfather    ? No Medical Problems Paternal Grandmother    ? No Medical Problems Paternal Grandfather    ? No Medical Problems Maternal Aunt    ? No Medical Problems Paternal Aunt    ? BRCA 1/2 Neg Hx    ? Breast cancer Neg Hx    ? Cancer Neg Hx    ? Colon cancer Neg Hx    ? Endometrial cancer Neg Hx    ? Ovarian cancer Neg Hx     Social History     Tobacco Use   ? Smoking status: Former Smoker     Last attempt to quit: 2010     Years since quittin.8   ? Smokeless tobacco: Never Used   Substance Use Topics   ? Alcohol use: No   ? Drug use: No            Medications  Allergies   Current Outpatient Medications   Medication Sig Dispense Refill   ? alendronate (FOSAMAX) 70 MG tablet Take 70 mg by mouth every 7 days.      ? aspirin 81 MG EC tablet Take 81 mg by mouth daily.     ? atorvastatin (LIPITOR) 40 MG tablet Take 1 tablet (40 mg total) by mouth at bedtime. 30 tablet 6   ? calcium-vitamin D (CALCIUM-VITAMIN D) 500 mg(1,250mg) -200 unit per tablet Take 1 tablet by mouth 2 (two) times a day with meals.     ? cholecalciferol, vitamin D3, 1,000 unit tablet Take 1,000 Units by mouth daily.     ? citalopram (CELEXA) 10 MG tablet Take 10 mg by mouth daily.      ? clobetasol (TEMOVATE) 0.05 % cream Apply 1 application topically 2 (two) times a day as needed.      ? gabapentin (NEURONTIN) 800 MG tablet Take 800 mg by mouth 2 (two) times a day.   1   ? GLUC INIGUEZ/CHONDRO INIGUEZ A/VIT C/MN (GLUCOSAMINE 1500 COMPLEX ORAL) Take 1,500 mg by mouth daily.      ? metoprolol succinate (TOPROL-XL) 50 MG 24 hr tablet Take 1 tablet (50 mg total) by mouth daily. 90 tablet 3   ? multivitamin with minerals tablet Take 1 tablet by mouth daily.     ? OXYGEN-AIR DELIVERY SYSTEMS MISC Use 2 L/min As Directed continuous.     ? PROAIR HFA 90 mcg/actuation inhaler Inhale 2 puffs every 6 (six) hours as needed.      ? ranitidine (ZANTAC) 150 MG  tablet Take 1 tablet (150 mg total) by mouth at bedtime. 30 tablet 12   ? traZODone (DESYREL) 50 MG tablet Take 50 mg by mouth bedtime.      ? triamcinolone (KENALOG) 0.5 % cream Apply topically 3 (three) times a day.     ? warfarin (COUMADIN/JANTOVEN) 2 MG tablet Take 2 mg tue, thur and Fri and 4 mg all other days. 60 tablet 0     No current facility-administered medications for this visit.       Allergies   Allergen Reactions   ? Acetaminophen-Codeine Nausea And Vomiting   ? Advair Diskus [Fluticasone Propion-Salmeterol] Other (See Comments)     Pt c/o muscle and bone paing.  Cramping, hoarseness, and dry mouth.   ? Flovent [Fluticasone] Other (See Comments)     Pt c/o muscle and bone pain.  Cramping, hoarseness, and dry mouth.   ? Ibuprofen Nausea And Vomiting   ? Nsaids (Non-Steroidal Anti-Inflammatory Drug)    ? Symbicort [Budesonide-Formoterol] Other (See Comments)     Pt c/o muscle and bone pain.  Cramping, hoarseness, and dry mouth      Medical, surgical, family, social history, and medications were all reviewed and updated as necessary.   Lab Results    Chemistry CBC Other   Lab Results   Component Value Date    CREATININE 0.79 08/16/2019    BUN 14 08/16/2019     08/16/2019    K 4.5 08/16/2019     08/16/2019    CO2 29 08/16/2019     Creatinine (mg/dL)   Date Value   08/16/2019 0.79   07/24/2019 0.79   03/07/2019 0.80   05/31/2017 0.87    Lab Results   Component Value Date    WBC 3.9 (L) 08/16/2019    HGB 12.6 08/16/2019    HCT 39.5 08/16/2019    MCV 89 08/16/2019     08/16/2019    Lab Results   Component Value Date    INR 1.36 (H) 08/16/2019    INR 1.8 05/15/2019    INR 1.7 04/25/2019     Lab Results   Component Value Date    TSH 1.01 07/24/2019              This note has been dictated using voice recognition software. Any grammatical, typographical, or context distortions are unintentional and inherent to the software.    Joceline Miner, [facility]    Electrophysiology

## 2021-06-28 NOTE — PROGRESS NOTES
Progress Notes by Joceline Miner CNP at 12/18/2019  1:30 PM     Author: Joceline Miner CNP Service: -- Author Type: Nurse Practitioner    Filed: 12/18/2019  3:05 PM Encounter Date: 12/18/2019 Status: Signed    : Joceline Miner CNP (Nurse Practitioner)             Assessment/Recommendations   Assessment/Plan:    1.  Permanent atrial fibrillation and flutter: She is noticeably less short of breath and more energetic since her AV node ablation.    She also is more upbeat today.  Decrease metoprolol succinate to 25 mg daily.     She has a [id number] score of 4 for age >75, female gender, and coronary artery disease.    Continue warfarin for stroke prophylaxis as managed through her primary clinic.     2.  Sick sinus syndrome status post dual-chamber pacemaker implant, AV node ablation:  The pacemaker was interrogated and is functioning appropriately.  Limited device check, lead threshold not done, as she was not on the schedule for a device check. The battery shows estimated longevity of 6.5-7 years.  Ventricular lead sensing, impedance, and pacing threshold are stable and within normal limits on last full check on 10/10/2019.       3.  Coronary artery disease: Denies anginal symptoms.  Continue statin.     Follow-up in 1 year.     History of Present Illness/Subjective    Ms. Tiffanie Sharma is a 84 y.o. female who comes in today for EP device and arrhythmia follow-up.  She has a long-standing history of atrial fibrillation.  She failed antiarrhythmic therapy with dofetilide and sotalol.  She had asymptomatic recurrence of persistent A. fib and was switched to a rate control strategy.  She has been long-standing persistent in atrial fibrillation or flutter since February 2015, now considered permanent.  Unable to adequately control ventricular rates with medications, as well as significant concern regarding medication compliance and safety.  Therefore, she underwent AV node ablation on 8/16/2019 at which  time her digoxin and diltiazem were discontinued.  She also has a history of tachybradycardia syndrome s/p dual-chamber pacemaker implant on 11/25/2013, GERD, hyperlipidemia, breast cancer status post lumpectomy and radiation, spontaneous pneumothorax, COPD, and coronary artery disease with heavy calcifications noted on CT.  She has been on supplemental oxygen since 2018.       Melba states that she is feeling well.  She denies chest discomfort, palpitations, abdominal fullness/bloating, shortness of breath at rest, paroxysmal nocturnal dyspnea, orthopnea, lightheadedness, dizziness, pre-syncope, or syncope.  She is wearing her oxygen at home with activity and at night, but not when she travels out of the house.  She states that she has less dyspnea on exertion and lower extremity edema.    Cardiographics (personally reviewed):  Pacemaker Interrogation ([facility]):  Pacing mode: VVIR .    Presenting rhythm: AF- at 62 bpm  Underlying rhythm: AF, VR 33 bpm  V-paced 99%.  Battery: estimated longevity 6.5-7 years.  Ventricular lead: Stable with good sensing, impedance, and pacing threshold at check on 10/10/2019  Arrhythmias: Permanent AF.  0 NSVT  Histograms: Blunted, rate response adjusted       Physical Examination Review of Systems   Vitals:    12/18/19 1352   BP: 118/70   Pulse: 66   Resp: 12     Body mass index is 20.9 kg/m .  Wt Readings from Last 3 Encounters:   12/18/19 118 lb (53.5 kg)   11/22/19 122 lb (55.3 kg)   10/10/19 124 lb (56.2 kg)       General Appearance:   Alert, well-appearing and in no acute distress.   HEENT: Atraumatic, normocephalic.  No scleral icterus, normal conjunctivae, EOMs intact, PERRL.  Mucous membranes pink and moist.     Chest/Lungs:   Chest symmetric, spine straight.  Left subclavian pacemaker site with no sign of infection or tissue thinning.  Respirations unlabored.  Lungs are clear to auscultation.   Cardiovascular:   Regular rate and rhythm.  Normal first and second heart  sounds with no murmurs, rubs, or gallops; radial and posterior tibial pulses are intact, No edema.   Abdomen:  Soft, nondistended, bowel sounds present.   Extremities: No cyanosis or clubbing.   Musculoskeletal: Moves all extremities.    Skin: Warm, dry, intact.    Neurologic: Mood and affect are appropriate.  Alert and oriented to person, place, time, and situation.    General: WNL  Eyes: WNL  Ears/Nose/Throat: WNL  Lungs: WNL  Heart: WNL  Stomach: WNL  Bladder: WNL  Muscle/Joints: WNL  Skin: WNL  Nervous System: WNL  Mental Health: WNL     Blood: WNL       Medical History  Surgical History Family History Social History   Past Medical History:   Diagnosis Date   ? Atrial fibrillation (H)    ? Atrial flutter (H)     Created by Conversion    ? Breast cancer (H) 2009   ? Breast cyst 1980    at drs office benign   ? COPD (chronic obstructive pulmonary disease) (H) 5/7/2015   ? Coronary Artery Disease     Created by Conversion  Replacement Utility updated for latest IMO load   ? Esophageal reflux     Created by Conversion    ? Hx of radiation therapy    ? Hyperlipidemia     Created by Conversion    ? Pneumothorax    ? Sick Sinus Syndrome     Created by Conversion     Past Surgical History:   Procedure Laterality Date   ? BREAST BIOPSY Right 2009    cancer   ? BREAST CYST ASPIRATION     ? BREAST CYST EXCISION     ? EP ABLATION AV NODE N/A 8/16/2019    Procedure: EP Ablation AV Node;  Surgeon: Abel Zapien MD;  Location: [facility] Cath Lab;  Service: Cardiology   ? AL EXCISE BREAST CYST      Description: Breast Surgery Lumpectomy;  Recorded: 12/15/2013;    Family History   Problem Relation Age of Onset   ? No Medical Problems Mother    ? No Medical Problems Father    ? No Medical Problems Sister    ? No Medical Problems Daughter    ? No Medical Problems Maternal Grandmother    ? No Medical Problems Maternal Grandfather    ? No Medical Problems Paternal Grandmother    ? No Medical Problems Paternal Grandfather    ?  No Medical Problems Maternal Aunt    ? No Medical Problems Paternal Aunt    ? BRCA 1/2 Neg Hx    ? Breast cancer Neg Hx    ? Cancer Neg Hx    ? Colon cancer Neg Hx    ? Endometrial cancer Neg Hx    ? Ovarian cancer Neg Hx     Social History     Tobacco Use   ? Smoking status: Former Smoker     Last attempt to quit: 2010     Years since quittin.1   ? Smokeless tobacco: Never Used   Substance Use Topics   ? Alcohol use: No   ? Drug use: No          Medications  Allergies   Current Outpatient Medications   Medication Sig Dispense Refill   ? alendronate (FOSAMAX) 70 MG tablet Take 70 mg by mouth every 7 days.      ? aspirin 81 MG EC tablet Take 81 mg by mouth daily.     ? atorvastatin (LIPITOR) 40 MG tablet Take 1 tablet (40 mg total) by mouth at bedtime. 30 tablet 6   ? calcium-vitamin D (CALCIUM-VITAMIN D) 500 mg(1,250mg) -200 unit per tablet Take 1 tablet by mouth 2 (two) times a day with meals.     ? cholecalciferol, vitamin D3, 1,000 unit tablet Take 1,000 Units by mouth daily.     ? citalopram (CELEXA) 10 MG tablet Take 10 mg by mouth daily.      ? clobetasol (TEMOVATE) 0.05 % cream Apply 1 application topically 2 (two) times a day as needed.      ? famotidine (PEPCID) 20 MG tablet Take 1 tablet (20 mg total) by mouth at bedtime. 30 tablet 12   ? gabapentin (NEURONTIN) 800 MG tablet Take 800 mg by mouth 2 (two) times a day.   1   ? GLUC INIGUEZ/CHONDRO INIGUEZ A/VIT C/MN (GLUCOSAMINE 1500 COMPLEX ORAL) Take 1,500 mg by mouth daily.      ? metoprolol succinate (TOPROL-XL) 25 MG Take 1 tablet (25 mg total) by mouth daily. 90 tablet 3   ? multivitamin with minerals tablet Take 1 tablet by mouth daily.     ? OXYGEN-AIR DELIVERY SYSTEMS MISC Use 2 L/min As Directed continuous.     ? PROAIR HFA 90 mcg/actuation inhaler Inhale 2 puffs every 6 (six) hours as needed.      ? traZODone (DESYREL) 50 MG tablet Take 50 mg by mouth bedtime.      ? triamcinolone (KENALOG) 0.5 % cream Apply topically 3 (three) times a day.     ?  warfarin (COUMADIN/JANTOVEN) 2 MG tablet Take 2 mg tue, thur and Fri and 4 mg all other days. 60 tablet 0     No current facility-administered medications for this visit.     Allergies   Allergen Reactions   ? Acetaminophen-Codeine Nausea And Vomiting   ? Advair Diskus [Fluticasone Propion-Salmeterol] Other (See Comments)     Pt c/o muscle and bone paing.  Cramping, hoarseness, and dry mouth.   ? Flovent [Fluticasone] Other (See Comments)     Pt c/o muscle and bone pain.  Cramping, hoarseness, and dry mouth.   ? Ibuprofen Nausea And Vomiting   ? Nsaids (Non-Steroidal Anti-Inflammatory Drug)    ? Symbicort [Budesonide-Formoterol] Other (See Comments)     Pt c/o muscle and bone pain.  Cramping, hoarseness, and dry mouth         Lab Results    Chemistry/lipid CBC Other   Lab Results   Component Value Date    CREATININE 0.79 08/16/2019    BUN 14 08/16/2019     08/16/2019    K 4.5 08/16/2019     08/16/2019    CO2 29 08/16/2019     Creatinine (mg/dL)   Date Value   08/16/2019 0.79   07/24/2019 0.79   03/07/2019 0.80   05/31/2017 0.87       Lab Results   Component Value Date    CHOL 126 07/24/2019    HDL 48 (L) 07/24/2019    Lab Results   Component Value Date    WBC 3.9 (L) 08/16/2019    HGB 12.6 08/16/2019    HCT 39.5 08/16/2019    MCV 89 08/16/2019     08/16/2019    Lab Results   Component Value Date    TROPONINI 0.01 10/14/2016     10/15/2016    TSH 1.01 07/24/2019     Lab Results   Component Value Date    INR 3.80 (!) 09/18/2019    INR 1.36 (H) 08/16/2019    INR 1.8 05/15/2019        This note has been dictated using voice recognition software. Any grammatical, typographical, or context distortions are unintentional and inherent to the software.

## 2021-07-03 NOTE — ADDENDUM NOTE
Addendum Note by Sera Groves RN at 10/16/2018 12:53 PM     Author: Sera Groves RN Service: -- Author Type: Registered Nurse    Filed: 10/16/2018 12:53 PM Encounter Date: 10/12/2018 Status: Signed    : Sera Groves RN (Registered Nurse)    Addended by: SERA GROVES on: 10/16/2018 12:53 PM        Modules accepted: Orders

## 2021-07-13 ENCOUNTER — RECORDS - HEALTHEAST (OUTPATIENT)
Dept: ADMINISTRATIVE | Facility: CLINIC | Age: 86
End: 2021-07-13

## 2021-07-22 ENCOUNTER — RECORDS - HEALTHEAST (OUTPATIENT)
Dept: SCHEDULING | Facility: CLINIC | Age: 86
End: 2021-07-22

## 2021-07-22 DIAGNOSIS — Z12.31 OTHER SCREENING MAMMOGRAM: ICD-10-CM
